# Patient Record
Sex: MALE | Race: BLACK OR AFRICAN AMERICAN | NOT HISPANIC OR LATINO | URBAN - METROPOLITAN AREA
[De-identification: names, ages, dates, MRNs, and addresses within clinical notes are randomized per-mention and may not be internally consistent; named-entity substitution may affect disease eponyms.]

---

## 2019-07-27 ENCOUNTER — HOSPITAL ENCOUNTER (OUTPATIENT)
Dept: LAB | Facility: HOSPITAL | Age: 34
Discharge: HOME | End: 2019-07-27
Attending: EMERGENCY MEDICINE

## 2019-10-30 ENCOUNTER — OFFICE VISIT CONVERTED (OUTPATIENT)
Dept: ORTHOPEDIC SURGERY | Facility: CLINIC | Age: 34
End: 2019-10-30
Attending: ORTHOPAEDIC SURGERY

## 2019-11-15 ENCOUNTER — OFFICE VISIT CONVERTED (OUTPATIENT)
Dept: ORTHOPEDIC SURGERY | Facility: CLINIC | Age: 34
End: 2019-11-15
Attending: PHYSICIAN ASSISTANT

## 2021-05-15 VITALS — WEIGHT: 211.37 LBS | BODY MASS INDEX: 28.01 KG/M2 | HEART RATE: 83 BPM | OXYGEN SATURATION: 98 % | HEIGHT: 73 IN

## 2021-05-15 VITALS — BODY MASS INDEX: 27.83 KG/M2 | OXYGEN SATURATION: 99 % | WEIGHT: 210 LBS | HEIGHT: 73 IN | HEART RATE: 102 BPM

## 2021-07-22 PROBLEM — F41.9 ANXIETY: Status: ACTIVE | Noted: 2021-07-22

## 2021-07-22 PROBLEM — G89.29 CHRONIC BACK PAIN: Status: ACTIVE | Noted: 2021-07-22

## 2021-07-22 PROBLEM — J30.9 ALLERGIC RHINITIS: Status: ACTIVE | Noted: 2021-07-22

## 2021-07-22 PROBLEM — M54.9 CHRONIC BACK PAIN: Status: ACTIVE | Noted: 2021-07-22

## 2021-07-22 PROBLEM — F32.A DEPRESSION: Status: ACTIVE | Noted: 2021-07-22

## 2021-07-22 PROBLEM — Z00.00 ANNUAL PHYSICAL EXAM: Status: ACTIVE | Noted: 2021-07-22

## 2021-07-22 NOTE — PROGRESS NOTES
"Chief Complaint  Pain (fell off of a step ladder last weekend and fractured rib on the Left side, still in severe pain, now that he is sleeping on his right side, his shoulder is bothering him)    Subjective          Jose Lyn presents to [facility]  History of Present Illness    35-year-old male with underlying hyperlipidemia, mild intermittent asthma, strong family history of colon cancer, who is being seen as a new patient in that his last physical was over 3 years ago.  We will review any labs that he perhaps happened to obtain before the appointment, order routine labs if none done, and address any new concerns.    Review of Systems   Constitutional: Negative for appetite change, fatigue and fever.   HENT: Negative for congestion and ear pain.    Eyes: Negative for blurred vision.   Respiratory: Negative for cough, chest tightness, shortness of breath and wheezing.         Patient has pleuritic chest pain to be expected.   Cardiovascular: Positive for chest pain. Negative for palpitations and leg swelling.        Patient with left chest wall pain secondary to the left 10th rib fracture.   Gastrointestinal: Negative for abdominal pain.   Genitourinary: Negative for difficulty urinating, dysuria and hematuria.   Musculoskeletal: Negative for arthralgias and gait problem.   Skin: Negative for skin lesions.   Neurological: Negative for syncope, memory problem and confusion.   Psychiatric/Behavioral: Negative for self-injury and depressed mood.       Objective   Vital Signs:   /92   Pulse 85   Temp 97.5 °F (36.4 °C)   Ht 185.4 cm (73\")   Wt 100 kg (221 lb 3.2 oz)   SpO2 97%   BMI 29.18 kg/m²           Physical Exam  Vitals and nursing note reviewed.   Constitutional:       General: He is not in acute distress.     Appearance: Normal appearance. He is not toxic-appearing.   HENT:      Head: Atraumatic.      Right Ear: External ear normal.      Left Ear: External " ear normal.      Nose: Nose normal.      Mouth/Throat:      Mouth: Mucous membranes are moist.   Eyes:      General:         Right eye: No discharge.         Left eye: No discharge.      Extraocular Movements: Extraocular movements intact.      Pupils: Pupils are equal, round, and reactive to light.   Neck:      Vascular: No carotid bruit.   Cardiovascular:      Rate and Rhythm: Normal rate and regular rhythm.      Pulses: Normal pulses.      Heart sounds: Normal heart sounds. No murmur heard.   No gallop.    Pulmonary:      Effort: Pulmonary effort is normal. No respiratory distress.      Breath sounds: No wheezing, rhonchi or rales.   Abdominal:      General: There is no distension.      Palpations: Abdomen is soft. There is no mass.      Tenderness: There is no abdominal tenderness. There is no guarding.   Musculoskeletal:         General: No swelling or tenderness.      Cervical back: No tenderness.      Right lower leg: No edema.      Left lower leg: No edema.   Skin:     General: Skin is warm and dry.      Findings: No rash.   Neurological:      General: No focal deficit present.      Mental Status: He is alert and oriented to person, place, and time. Mental status is at baseline.      Motor: No weakness.      Gait: Gait normal.   Psychiatric:         Mood and Affect: Mood normal.         Thought Content: Thought content normal.          Result Review :   The following data was reviewed by: Tyron Jason MD on 07/23/2021:                  Assessment and Plan    Diagnoses and all orders for this visit:    1. Annual physical exam (Primary)  Overview:  Patient with no ischemic symptoms with routine activities.  He is a .  Other than this recent issue with the fall, he has not had to be evaluated in the urgent cares or ERs.  It has been over 3 years since he has had any labs, his cholesterol was significantly elevated last time I saw him, so we should at least repeat that before too long.  His BP is up a  little bit today, this perhaps is to be expected given the pain.  Do not have easy access to what it was running in the past but has been 3 years.  We will follow up blood pressure on return to office.    (He does have family history of early heart disease, his mom had a stroke in the past just last year in 2020 at the age of only 63).    Orders:  -     CBC & Differential; Future  -     Comprehensive Metabolic Panel; Future  -     TSH+Free T4; Future    2. Closed fracture of one rib of left side with routine healing  Overview:  Patient was seen in urgent care just 4 days ago and diagnosed with a left 10th rib fracture:    Patient states that he fell off a stepstool and struck the side of a door frame.  Initially, he states he did not feel much pain.  However, today, he noticed pain in the left lower lateral rib area.  No cough or problems breathing.  Pain with movement and palpation.  No melena, hematochezia, or hematemesis.  No hematuria.  No saddle anesthesia.  No loss of bowel or bladder function.  No other injuries referred at this time.    Orders:  -     HYDROcodone-acetaminophen (Norco) 5-325 MG per tablet; Take 1 tablet by mouth Every 8 (Eight) Hours As Needed for Moderate Pain .  Dispense: 20 tablet; Refill: 0    3. Mixed hyperlipidemia  Overview:  LDL was 143 back in 2018.  He does have family history of heart disease and stroke.  Need to repeat this before too long, and I asked the patient to call for the results.    Orders:  -     Lipid Panel; Future      ANNUAL PHYSICAL 1/29/18:   = d/w all labs in detail; all good minus below; no CP/SOB at work; no new concerns;  --  LIPIDS witih , HDL 80's, but smoker with CAD F age 59, so continue to follow--->  and d/w diet please=get at least the 10 lbs off just put on and perhaps 20 total eventually; d/w diet drinks; f/u 6 mo now.  --  A.R. and no recent flares---> occ MDI use.  --  VIT D DEF per 7/15 labs and tx was started...rec lower to 2x/mo  now--->29 on q mo and rec 2x/mo at 1/18 OV.  --  MAJOR DEPRESSION '12 and again '14...no SI, has f/u with [facility]...resolved.  --  GERD w/o dysphagia=stable off med---> EGD '17 per Dr Mccall with ? PUD, tx'd with PPI, but stopped after f/u EGD.  HEMORRHOIDS and will julee with surgeon now=2/16--->no surgery rec.  --  FH= M/F/B/2 S...+ HTN/CVA/DM.  --  PSA  COLON julee 2/18...+polyp/FH+...per Dr Mccall <---FH+ COLON CA=40 yo Brother just dx'd ('14) and agree with screen ASAP  ( '12,  at [employer]; 3 yo son 5/17 and trying for another; 2 S, B above, parents alive 2/17)    Follow Up   Return in about 4 months (around 11/23/2021).  Patient was given instructions and counseling regarding his condition or for health maintenance advice. Please see specific information pulled into the AVS if appropriate.

## 2021-07-23 ENCOUNTER — OFFICE VISIT (OUTPATIENT)
Dept: INTERNAL MEDICINE | Facility: CLINIC | Age: 36
End: 2021-07-23

## 2021-07-23 VITALS
DIASTOLIC BLOOD PRESSURE: 92 MMHG | HEIGHT: 73 IN | TEMPERATURE: 97.5 F | SYSTOLIC BLOOD PRESSURE: 137 MMHG | BODY MASS INDEX: 29.31 KG/M2 | OXYGEN SATURATION: 97 % | WEIGHT: 221.2 LBS | HEART RATE: 85 BPM

## 2021-07-23 DIAGNOSIS — E78.2 MIXED HYPERLIPIDEMIA: ICD-10-CM

## 2021-07-23 DIAGNOSIS — S22.32XD CLOSED FRACTURE OF ONE RIB OF LEFT SIDE WITH ROUTINE HEALING: ICD-10-CM

## 2021-07-23 DIAGNOSIS — Z00.00 ANNUAL PHYSICAL EXAM: Primary | ICD-10-CM

## 2021-07-23 PROCEDURE — 99385 PREV VISIT NEW AGE 18-39: CPT | Performed by: INTERNAL MEDICINE

## 2021-07-23 RX ORDER — HYDROCODONE BITARTRATE AND ACETAMINOPHEN 5; 325 MG/1; MG/1
1 TABLET ORAL EVERY 8 HOURS PRN
Qty: 20 TABLET | Refills: 0 | Status: SHIPPED | OUTPATIENT
Start: 2021-07-23 | End: 2021-11-24

## 2021-11-20 ENCOUNTER — LAB (OUTPATIENT)
Dept: LAB | Facility: HOSPITAL | Age: 36
End: 2021-11-20

## 2021-11-20 DIAGNOSIS — E78.2 MIXED HYPERLIPIDEMIA: ICD-10-CM

## 2021-11-20 DIAGNOSIS — Z00.00 ANNUAL PHYSICAL EXAM: ICD-10-CM

## 2021-11-20 LAB
ALBUMIN SERPL-MCNC: 4.8 G/DL (ref 3.5–5.2)
ALBUMIN/GLOB SERPL: 1.5 G/DL
ALP SERPL-CCNC: 86 U/L (ref 39–117)
ALT SERPL W P-5'-P-CCNC: 59 U/L (ref 1–41)
ANION GAP SERPL CALCULATED.3IONS-SCNC: 10.6 MMOL/L (ref 5–15)
AST SERPL-CCNC: 36 U/L (ref 1–40)
BASOPHILS # BLD AUTO: 0.08 10*3/MM3 (ref 0–0.2)
BASOPHILS NFR BLD AUTO: 1.4 % (ref 0–1.5)
BILIRUB SERPL-MCNC: 0.2 MG/DL (ref 0–1.2)
BUN SERPL-MCNC: 8 MG/DL (ref 6–20)
BUN/CREAT SERPL: 8.5 (ref 7–25)
CALCIUM SPEC-SCNC: 9.5 MG/DL (ref 8.6–10.5)
CHLORIDE SERPL-SCNC: 105 MMOL/L (ref 98–107)
CHOLEST SERPL-MCNC: 245 MG/DL (ref 0–200)
CO2 SERPL-SCNC: 24.4 MMOL/L (ref 22–29)
CREAT SERPL-MCNC: 0.94 MG/DL (ref 0.76–1.27)
DEPRECATED RDW RBC AUTO: 41 FL (ref 37–54)
EOSINOPHIL # BLD AUTO: 0.17 10*3/MM3 (ref 0–0.4)
EOSINOPHIL NFR BLD AUTO: 3.1 % (ref 0.3–6.2)
ERYTHROCYTE [DISTWIDTH] IN BLOOD BY AUTOMATED COUNT: 13.4 % (ref 12.3–15.4)
GFR SERPL CREATININE-BSD FRML MDRD: 110 ML/MIN/1.73
GLOBULIN UR ELPH-MCNC: 3.1 GM/DL
GLUCOSE SERPL-MCNC: 103 MG/DL (ref 65–99)
HCT VFR BLD AUTO: 50 % (ref 37.5–51)
HDLC SERPL-MCNC: 48 MG/DL (ref 40–60)
HGB BLD-MCNC: 17.3 G/DL (ref 13–17.7)
IMM GRANULOCYTES # BLD AUTO: 0.03 10*3/MM3 (ref 0–0.05)
IMM GRANULOCYTES NFR BLD AUTO: 0.5 % (ref 0–0.5)
LDLC SERPL CALC-MCNC: 158 MG/DL (ref 0–100)
LDLC/HDLC SERPL: 3.23 {RATIO}
LYMPHOCYTES # BLD AUTO: 2 10*3/MM3 (ref 0.7–3.1)
LYMPHOCYTES NFR BLD AUTO: 36.2 % (ref 19.6–45.3)
MCH RBC QN AUTO: 29.3 PG (ref 26.6–33)
MCHC RBC AUTO-ENTMCNC: 34.6 G/DL (ref 31.5–35.7)
MCV RBC AUTO: 84.6 FL (ref 79–97)
MONOCYTES # BLD AUTO: 0.66 10*3/MM3 (ref 0.1–0.9)
MONOCYTES NFR BLD AUTO: 11.9 % (ref 5–12)
NEUTROPHILS NFR BLD AUTO: 2.59 10*3/MM3 (ref 1.7–7)
NEUTROPHILS NFR BLD AUTO: 46.9 % (ref 42.7–76)
NRBC BLD AUTO-RTO: 0.2 /100 WBC (ref 0–0.2)
PLATELET # BLD AUTO: 213 10*3/MM3 (ref 140–450)
PMV BLD AUTO: 11 FL (ref 6–12)
POTASSIUM SERPL-SCNC: 4.4 MMOL/L (ref 3.5–5.2)
PROT SERPL-MCNC: 7.9 G/DL (ref 6–8.5)
RBC # BLD AUTO: 5.91 10*6/MM3 (ref 4.14–5.8)
SODIUM SERPL-SCNC: 140 MMOL/L (ref 136–145)
T4 FREE SERPL-MCNC: 0.9 NG/DL (ref 0.93–1.7)
TRIGL SERPL-MCNC: 211 MG/DL (ref 0–150)
TSH SERPL DL<=0.05 MIU/L-ACNC: 1.3 UIU/ML (ref 0.27–4.2)
VLDLC SERPL-MCNC: 39 MG/DL (ref 5–40)
WBC NRBC COR # BLD: 5.53 10*3/MM3 (ref 3.4–10.8)

## 2021-11-20 PROCEDURE — 84443 ASSAY THYROID STIM HORMONE: CPT

## 2021-11-20 PROCEDURE — 80053 COMPREHEN METABOLIC PANEL: CPT

## 2021-11-20 PROCEDURE — 36415 COLL VENOUS BLD VENIPUNCTURE: CPT

## 2021-11-20 PROCEDURE — 80061 LIPID PANEL: CPT

## 2021-11-20 PROCEDURE — 84439 ASSAY OF FREE THYROXINE: CPT

## 2021-11-20 PROCEDURE — 85025 COMPLETE CBC W/AUTO DIFF WBC: CPT

## 2021-11-23 NOTE — PROGRESS NOTES
"Chief Complaint:  Follow-up (4 month follow up)    Subjective          Jose Lyn presents to [facility]    History of present illness:    35-year-old male with underlying hyperlipidemia, mild intermittent asthma, strong family history of colon cancer, who is being seen 7/21 as a new patient in that his last physical was over 3 years ago.  We will review any labs that he perhaps happened to obtain before the appointment, order routine labs if none done, and address any new concerns.---> Patient coming back 11/21 for routine 4-month follow-up of the above.    Review of Systems   Constitutional: Negative for appetite change, fatigue and fever.   HENT: Negative for congestion and ear pain.    Eyes: Negative for blurred vision.   Respiratory: Negative for cough, chest tightness, shortness of breath and wheezing.         Patient has pleuritic chest pain to be expected.   Cardiovascular: Positive for chest pain. Negative for palpitations and leg swelling.        Patient with left chest wall pain secondary to the left 10th rib fracture.   Gastrointestinal: Negative for abdominal pain.   Genitourinary: Negative for difficulty urinating, dysuria and hematuria.   Musculoskeletal: Negative for arthralgias and gait problem.   Skin: Negative for skin lesions.   Neurological: Negative for syncope, memory problem and confusion.   Psychiatric/Behavioral: Negative for self-injury and depressed mood.       Objective   Vital Signs:   /88   Pulse 70   Temp 97.1 °F (36.2 °C) (Temporal)   Ht 185.4 cm (72.99\")   Wt 105 kg (230 lb 6.4 oz)   SpO2 97%   BMI 30.40 kg/m²           Physical Exam  Vitals and nursing note reviewed.   Constitutional:       General: He is not in acute distress.     Appearance: Normal appearance. He is not toxic-appearing.   HENT:      Head: Atraumatic.      Right Ear: External ear normal.      Left Ear: External ear normal.      Nose: Nose normal.      " Mouth/Throat:      Mouth: Mucous membranes are moist.   Eyes:      General:         Right eye: No discharge.         Left eye: No discharge.      Extraocular Movements: Extraocular movements intact.      Pupils: Pupils are equal, round, and reactive to light.   Neck:      Vascular: No carotid bruit.   Cardiovascular:      Rate and Rhythm: Normal rate and regular rhythm.      Pulses: Normal pulses.      Heart sounds: Normal heart sounds. No murmur heard.  No gallop.    Pulmonary:      Effort: Pulmonary effort is normal. No respiratory distress.      Breath sounds: No wheezing, rhonchi or rales.   Abdominal:      General: There is no distension.      Palpations: Abdomen is soft. There is no mass.      Tenderness: There is no abdominal tenderness. There is no guarding.   Musculoskeletal:         General: No swelling or tenderness.      Cervical back: No tenderness.      Right lower leg: No edema.      Left lower leg: No edema.   Skin:     General: Skin is warm and dry.      Findings: No rash.   Neurological:      General: No focal deficit present.      Mental Status: He is alert and oriented to person, place, and time. Mental status is at baseline.      Motor: No weakness.      Gait: Gait normal.   Psychiatric:         Mood and Affect: Mood normal.         Thought Content: Thought content normal.          Result Review :   The following data was reviewed by: Tyron Jason MD on 07/23/2021:                  Assessment and Plan    Diagnoses and all orders for this visit:    1. Adenomatous polyp of colon, unspecified part of colon (Primary)  Overview:  Patient with history of prior polyps, also with family history significant for early onset colon cancer.  Patient's last scope was in 2/18, and he is to follow-up with Dr. Mcclelland in 5 to 7 years.  No symptoms as of 11/21 OV.      2. Mixed hyperlipidemia  Overview:  LDL was 143 back in 2018.  He does have family history of heart disease and stroke.---> LDL is up to 158 as of  11/21.  Previously his LDL was as low as 110 actually.  Patient is aware he needs to make some significant changes to his diet, will defer treatment at this time, but repeat labs in 6 months is recommended.    Orders:  -     Comprehensive Metabolic Panel; Future  -     Lipid Panel; Future  -     High Sensitivity CRP; Future  -     Homocysteine; Future    3. Elevated blood pressure reading in office without diagnosis of hypertension  Overview:  Blood pressure is upper limits normal as of 11/21 OV.  It was some better on my repeat.  His elevated level previously was related to a rib fracture.  He will monitor at home periodically and let us know if it is staying in this ballpark or not.      4. Other fatigue  Overview:  Need to keep an eye on his CBC in particular given smoking history.  Additionally, patient does have sickle cell trait.    Orders:  -     CBC & Differential; Future  -     Ferritin; Future  -     Iron Profile; Future      ANNUAL PHYSICAL 1/29/18:   = d/w all labs in detail; all good minus below; no CP/SOB at work; no new concerns;  --  LIPIDS witih , HDL 80's, but smoker with CAD F age 59, so continue to follow--->  and d/w diet please=get at least the 10 lbs off just put on and perhaps 20 total eventually; d/w diet drinks; f/u 6 mo now.  --  A.R. and no recent flares---> occ MDI use.  --  VIT D DEF per 7/15 labs and tx was started...rec lower to 2x/mo now--->29 on q mo and rec 2x/mo at 1/18 OV.  --  MAJOR DEPRESSION '12 and again '14...no SI, has f/u with [facility]...resolved.  --  GERD w/o dysphagia=stable off med---> EGD '17 per Dr Mccall with ? PUD, tx'd with PPI, but stopped after f/u EGD.  HEMORRHOIDS and will julee with surgeon now=2/16--->no surgery rec.  --  FH= M/F/B/2 S...+ HTN/CVA/DM.  --  PSA  COLON julee 2/18...+polyp/FH+...per Dr Mccall <---FH+ COLON CA=38 yo Brother just dx'd ('14) and agree with screen ASAP  (; son born 8/21, 7 yo son 8/21; 2 S, B above, mom passed  in 2020 with CHF/obesity.  Father living as of 11/21, has sickle cell trait, has had TIAs as well as history of CHF).    Follow Up   Return in about 6 months (around 5/24/2022).  Patient was given instructions and counseling regarding his condition or for health maintenance advice. Please see specific information pulled into the AVS if appropriate.

## 2021-11-24 ENCOUNTER — OFFICE VISIT (OUTPATIENT)
Dept: INTERNAL MEDICINE | Facility: CLINIC | Age: 36
End: 2021-11-24

## 2021-11-24 VITALS
HEART RATE: 70 BPM | DIASTOLIC BLOOD PRESSURE: 88 MMHG | WEIGHT: 230.4 LBS | BODY MASS INDEX: 30.54 KG/M2 | HEIGHT: 73 IN | SYSTOLIC BLOOD PRESSURE: 126 MMHG | TEMPERATURE: 97.1 F | OXYGEN SATURATION: 97 %

## 2021-11-24 DIAGNOSIS — R03.0 ELEVATED BLOOD PRESSURE READING IN OFFICE WITHOUT DIAGNOSIS OF HYPERTENSION: ICD-10-CM

## 2021-11-24 DIAGNOSIS — D12.6 ADENOMATOUS POLYP OF COLON, UNSPECIFIED PART OF COLON: Primary | ICD-10-CM

## 2021-11-24 DIAGNOSIS — E78.2 MIXED HYPERLIPIDEMIA: ICD-10-CM

## 2021-11-24 DIAGNOSIS — R53.83 OTHER FATIGUE: ICD-10-CM

## 2021-11-24 PROCEDURE — 99213 OFFICE O/P EST LOW 20 MIN: CPT | Performed by: INTERNAL MEDICINE

## 2021-11-24 RX ORDER — ACETAMINOPHEN 325 MG/1
650 TABLET ORAL EVERY 6 HOURS PRN
COMMUNITY
End: 2022-11-01

## 2022-10-14 ENCOUNTER — HOSPITAL ENCOUNTER (EMERGENCY)
Facility: HOSPITAL | Age: 37
Discharge: LEFT BEFORE DISCHARGE | End: 2022-10-14

## 2022-10-14 VITALS
RESPIRATION RATE: 18 BRPM | WEIGHT: 223.99 LBS | TEMPERATURE: 98.3 F | BODY MASS INDEX: 29.69 KG/M2 | OXYGEN SATURATION: 98 % | SYSTOLIC BLOOD PRESSURE: 145 MMHG | DIASTOLIC BLOOD PRESSURE: 83 MMHG | HEIGHT: 73 IN | HEART RATE: 77 BPM

## 2022-10-14 LAB
ABO GROUP BLD: NORMAL
ALBUMIN SERPL-MCNC: 5 G/DL (ref 3.5–5.2)
ALBUMIN/GLOB SERPL: 1.5 G/DL
ALP SERPL-CCNC: 80 U/L (ref 39–117)
ALT SERPL W P-5'-P-CCNC: 74 U/L (ref 1–41)
ANION GAP SERPL CALCULATED.3IONS-SCNC: 10.8 MMOL/L (ref 5–15)
AST SERPL-CCNC: 45 U/L (ref 1–40)
BASOPHILS # BLD AUTO: 0.08 10*3/MM3 (ref 0–0.2)
BASOPHILS NFR BLD AUTO: 1.2 % (ref 0–1.5)
BILIRUB SERPL-MCNC: 0.4 MG/DL (ref 0–1.2)
BLD GP AB SCN SERPL QL: NEGATIVE
BUN SERPL-MCNC: 10 MG/DL (ref 6–20)
BUN/CREAT SERPL: 9.8 (ref 7–25)
CALCIUM SPEC-SCNC: 10.1 MG/DL (ref 8.6–10.5)
CHLORIDE SERPL-SCNC: 101 MMOL/L (ref 98–107)
CO2 SERPL-SCNC: 27.2 MMOL/L (ref 22–29)
CREAT SERPL-MCNC: 1.02 MG/DL (ref 0.76–1.27)
DEPRECATED RDW RBC AUTO: 36.6 FL (ref 37–54)
EGFRCR SERPLBLD CKD-EPI 2021: 97.1 ML/MIN/1.73
EOSINOPHIL # BLD AUTO: 0.12 10*3/MM3 (ref 0–0.4)
EOSINOPHIL NFR BLD AUTO: 1.8 % (ref 0.3–6.2)
ERYTHROCYTE [DISTWIDTH] IN BLOOD BY AUTOMATED COUNT: 12.8 % (ref 12.3–15.4)
GLOBULIN UR ELPH-MCNC: 3.3 GM/DL
GLUCOSE SERPL-MCNC: 93 MG/DL (ref 65–99)
HCT VFR BLD AUTO: 44.2 % (ref 37.5–51)
HGB BLD-MCNC: 16.4 G/DL (ref 13–17.7)
HOLD SPECIMEN: NORMAL
HOLD SPECIMEN: NORMAL
IMM GRANULOCYTES # BLD AUTO: 0.02 10*3/MM3 (ref 0–0.05)
IMM GRANULOCYTES NFR BLD AUTO: 0.3 % (ref 0–0.5)
LYMPHOCYTES # BLD AUTO: 2.27 10*3/MM3 (ref 0.7–3.1)
LYMPHOCYTES NFR BLD AUTO: 33.6 % (ref 19.6–45.3)
MCH RBC QN AUTO: 29.7 PG (ref 26.6–33)
MCHC RBC AUTO-ENTMCNC: 37.1 G/DL (ref 31.5–35.7)
MCV RBC AUTO: 79.9 FL (ref 79–97)
MONOCYTES # BLD AUTO: 0.93 10*3/MM3 (ref 0.1–0.9)
MONOCYTES NFR BLD AUTO: 13.8 % (ref 5–12)
NEUTROPHILS NFR BLD AUTO: 3.33 10*3/MM3 (ref 1.7–7)
NEUTROPHILS NFR BLD AUTO: 49.3 % (ref 42.7–76)
NRBC BLD AUTO-RTO: 0 /100 WBC (ref 0–0.2)
PLATELET # BLD AUTO: 210 10*3/MM3 (ref 140–450)
PMV BLD AUTO: 9.9 FL (ref 6–12)
POTASSIUM SERPL-SCNC: 3.9 MMOL/L (ref 3.5–5.2)
PROT SERPL-MCNC: 8.3 G/DL (ref 6–8.5)
RBC # BLD AUTO: 5.53 10*6/MM3 (ref 4.14–5.8)
RH BLD: POSITIVE
SMALL PLATELETS BLD QL SMEAR: ADEQUATE
SODIUM SERPL-SCNC: 139 MMOL/L (ref 136–145)
T&S EXPIRATION DATE: NORMAL
TARGETS BLD QL SMEAR: NORMAL
WBC MORPH BLD: NORMAL
WBC NRBC COR # BLD: 6.75 10*3/MM3 (ref 3.4–10.8)
WHOLE BLOOD HOLD COAG: NORMAL
WHOLE BLOOD HOLD SPECIMEN: NORMAL

## 2022-10-14 PROCEDURE — 86850 RBC ANTIBODY SCREEN: CPT

## 2022-10-14 PROCEDURE — 85025 COMPLETE CBC W/AUTO DIFF WBC: CPT

## 2022-10-14 PROCEDURE — 85007 BL SMEAR W/DIFF WBC COUNT: CPT

## 2022-10-14 PROCEDURE — 99211 OFF/OP EST MAY X REQ PHY/QHP: CPT

## 2022-10-14 PROCEDURE — 36415 COLL VENOUS BLD VENIPUNCTURE: CPT

## 2022-10-14 PROCEDURE — 86901 BLOOD TYPING SEROLOGIC RH(D): CPT

## 2022-10-14 PROCEDURE — 86900 BLOOD TYPING SEROLOGIC ABO: CPT

## 2022-10-14 PROCEDURE — 80053 COMPREHEN METABOLIC PANEL: CPT

## 2022-10-14 RX ORDER — SODIUM CHLORIDE 0.9 % (FLUSH) 0.9 %
10 SYRINGE (ML) INJECTION AS NEEDED
Status: DISCONTINUED | OUTPATIENT
Start: 2022-10-14 | End: 2022-10-14 | Stop reason: HOSPADM

## 2022-10-21 ENCOUNTER — TELEPHONE (OUTPATIENT)
Dept: INTERNAL MEDICINE | Facility: CLINIC | Age: 37
End: 2022-10-21

## 2022-10-21 NOTE — TELEPHONE ENCOUNTER
Caller: Jose Lyn    Relationship: Self    Best call back number: [phone]    What is the medical concern/diagnosis:     What specialty or service is being requested: GASTRO    What is the provider, practice or medical service name: DR ALCANTAR     What is the office location: [address] [place] [address]    What is the office phone number: [phone]    Any additional details:

## 2022-10-25 ENCOUNTER — TELEPHONE (OUTPATIENT)
Dept: INTERNAL MEDICINE | Facility: CLINIC | Age: 37
End: 2022-10-25

## 2022-10-25 DIAGNOSIS — D12.6 ADENOMATOUS POLYP OF COLON, UNSPECIFIED PART OF COLON: Primary | ICD-10-CM

## 2022-10-25 NOTE — TELEPHONE ENCOUNTER
Spoke to Dr Mccall's office and they said they would be calling the pt to schedule. I informed the pt and he understood

## 2022-10-25 NOTE — TELEPHONE ENCOUNTER
Caller: Jose Lyn    Relationship: Self    Best call back number: [phone]    Who are you requesting to speak with (clinical staff, provider,  specific staff member): CLINICAL    What was the call regarding: PATIENT STATES HE WOULD LIKE TO KNOW IF THE REFERRAL WAS SENT TO DR ALCANTAR.    Do you require a callback: YES

## 2022-10-26 NOTE — TELEPHONE ENCOUNTER
This has been taken care of in another message and referral was put in to Dr. Mccall and pt has made an appt with Dr. Jason.

## 2022-11-01 ENCOUNTER — OFFICE VISIT (OUTPATIENT)
Dept: INTERNAL MEDICINE | Facility: CLINIC | Age: 37
End: 2022-11-01

## 2022-11-01 VITALS
TEMPERATURE: 98 F | WEIGHT: 226.8 LBS | OXYGEN SATURATION: 99 % | SYSTOLIC BLOOD PRESSURE: 152 MMHG | DIASTOLIC BLOOD PRESSURE: 100 MMHG | BODY MASS INDEX: 30.06 KG/M2 | HEIGHT: 73 IN | HEART RATE: 92 BPM

## 2022-11-01 DIAGNOSIS — Z00.00 ANNUAL PHYSICAL EXAM: ICD-10-CM

## 2022-11-01 DIAGNOSIS — R79.89 ELEVATED LFTS: ICD-10-CM

## 2022-11-01 DIAGNOSIS — E78.2 MIXED HYPERLIPIDEMIA: Primary | ICD-10-CM

## 2022-11-01 DIAGNOSIS — R53.83 OTHER FATIGUE: ICD-10-CM

## 2022-11-01 DIAGNOSIS — D12.6 ADENOMATOUS POLYP OF COLON, UNSPECIFIED PART OF COLON: ICD-10-CM

## 2022-11-01 DIAGNOSIS — R03.0 ELEVATED BLOOD PRESSURE READING IN OFFICE WITHOUT DIAGNOSIS OF HYPERTENSION: ICD-10-CM

## 2022-11-01 DIAGNOSIS — K92.1 HEMATOCHEZIA: ICD-10-CM

## 2022-11-01 LAB
BASOPHILS # BLD AUTO: 0.08 10*3/MM3 (ref 0–0.2)
BASOPHILS NFR BLD AUTO: 1.4 % (ref 0–1.5)
CHOLEST SERPL-MCNC: 274 MG/DL (ref 0–200)
DEPRECATED RDW RBC AUTO: 40 FL (ref 37–54)
EOSINOPHIL # BLD AUTO: 0.13 10*3/MM3 (ref 0–0.4)
EOSINOPHIL NFR BLD AUTO: 2.2 % (ref 0.3–6.2)
ERYTHROCYTE [DISTWIDTH] IN BLOOD BY AUTOMATED COUNT: 13.7 % (ref 12.3–15.4)
FERRITIN SERPL-MCNC: 713 NG/ML (ref 30–400)
FOLATE SERPL-MCNC: 8.02 NG/ML (ref 4.78–24.2)
HAV IGM SERPL QL IA: NORMAL
HBV CORE IGM SERPL QL IA: NORMAL
HBV SURFACE AG SERPL QL IA: NORMAL
HCT VFR BLD AUTO: 49.4 % (ref 37.5–51)
HCV AB SER DONR QL: NORMAL
HDLC SERPL-MCNC: 71 MG/DL (ref 40–60)
HGB BLD-MCNC: 17.4 G/DL (ref 13–17.7)
IMM GRANULOCYTES # BLD AUTO: 0.03 10*3/MM3 (ref 0–0.05)
IMM GRANULOCYTES NFR BLD AUTO: 0.5 % (ref 0–0.5)
IRON 24H UR-MRATE: 110 MCG/DL (ref 59–158)
IRON SATN MFR SERPL: 29 % (ref 20–50)
LDLC SERPL CALC-MCNC: 167 MG/DL (ref 0–100)
LDLC/HDLC SERPL: 2.3 {RATIO}
LYMPHOCYTES # BLD AUTO: 1.63 10*3/MM3 (ref 0.7–3.1)
LYMPHOCYTES NFR BLD AUTO: 28.1 % (ref 19.6–45.3)
MCH RBC QN AUTO: 29 PG (ref 26.6–33)
MCHC RBC AUTO-ENTMCNC: 35.2 G/DL (ref 31.5–35.7)
MCV RBC AUTO: 82.2 FL (ref 79–97)
MONOCYTES # BLD AUTO: 0.57 10*3/MM3 (ref 0.1–0.9)
MONOCYTES NFR BLD AUTO: 9.8 % (ref 5–12)
NEUTROPHILS NFR BLD AUTO: 3.36 10*3/MM3 (ref 1.7–7)
NEUTROPHILS NFR BLD AUTO: 58 % (ref 42.7–76)
NRBC BLD AUTO-RTO: 0 /100 WBC (ref 0–0.2)
PLATELET # BLD AUTO: 202 10*3/MM3 (ref 140–450)
PMV BLD AUTO: 10.5 FL (ref 6–12)
RBC # BLD AUTO: 6.01 10*6/MM3 (ref 4.14–5.8)
T4 FREE SERPL-MCNC: 0.97 NG/DL (ref 0.93–1.7)
TESTOST SERPL-MCNC: 549 NG/DL (ref 249–836)
TIBC SERPL-MCNC: 386 MCG/DL (ref 298–536)
TRANSFERRIN SERPL-MCNC: 259 MG/DL (ref 200–360)
TRIGL SERPL-MCNC: 200 MG/DL (ref 0–150)
TSH SERPL DL<=0.05 MIU/L-ACNC: 1.77 UIU/ML (ref 0.27–4.2)
VIT B12 BLD-MCNC: 668 PG/ML (ref 211–946)
VLDLC SERPL-MCNC: 36 MG/DL (ref 5–40)
WBC NRBC COR # BLD: 5.8 10*3/MM3 (ref 3.4–10.8)

## 2022-11-01 PROCEDURE — 82746 ASSAY OF FOLIC ACID SERUM: CPT | Performed by: INTERNAL MEDICINE

## 2022-11-01 PROCEDURE — 99395 PREV VISIT EST AGE 18-39: CPT | Performed by: INTERNAL MEDICINE

## 2022-11-01 PROCEDURE — 84466 ASSAY OF TRANSFERRIN: CPT | Performed by: INTERNAL MEDICINE

## 2022-11-01 PROCEDURE — 82728 ASSAY OF FERRITIN: CPT | Performed by: INTERNAL MEDICINE

## 2022-11-01 PROCEDURE — 80061 LIPID PANEL: CPT | Performed by: INTERNAL MEDICINE

## 2022-11-01 PROCEDURE — 82607 VITAMIN B-12: CPT | Performed by: INTERNAL MEDICINE

## 2022-11-01 PROCEDURE — 85025 COMPLETE CBC W/AUTO DIFF WBC: CPT | Performed by: INTERNAL MEDICINE

## 2022-11-01 PROCEDURE — 84443 ASSAY THYROID STIM HORMONE: CPT | Performed by: INTERNAL MEDICINE

## 2022-11-01 PROCEDURE — 84439 ASSAY OF FREE THYROXINE: CPT | Performed by: INTERNAL MEDICINE

## 2022-11-01 PROCEDURE — 36415 COLL VENOUS BLD VENIPUNCTURE: CPT | Performed by: INTERNAL MEDICINE

## 2022-11-01 PROCEDURE — 83540 ASSAY OF IRON: CPT | Performed by: INTERNAL MEDICINE

## 2022-11-01 PROCEDURE — 84403 ASSAY OF TOTAL TESTOSTERONE: CPT | Performed by: INTERNAL MEDICINE

## 2022-11-01 PROCEDURE — 80074 ACUTE HEPATITIS PANEL: CPT | Performed by: INTERNAL MEDICINE

## 2022-11-01 RX ORDER — ATORVASTATIN CALCIUM 10 MG/1
10 TABLET, FILM COATED ORAL NIGHTLY
Qty: 90 TABLET | Refills: 1 | Status: SHIPPED | OUTPATIENT
Start: 2022-11-01 | End: 2023-01-30

## 2022-11-01 RX ORDER — ATORVASTATIN CALCIUM 20 MG/1
10 TABLET, FILM COATED ORAL NIGHTLY
Qty: 90 TABLET | Refills: 1 | Status: SHIPPED | OUTPATIENT
Start: 2022-11-01 | End: 2022-11-01 | Stop reason: SDUPTHER

## 2022-11-01 RX ORDER — PANTOPRAZOLE SODIUM 40 MG/1
40 TABLET, DELAYED RELEASE ORAL DAILY
Qty: 30 TABLET | Refills: 2 | Status: SHIPPED | OUTPATIENT
Start: 2022-11-01 | End: 2023-02-01

## 2022-11-01 RX ORDER — HYDROCORTISONE ACETATE 25 MG/1
25 SUPPOSITORY RECTAL 2 TIMES DAILY
Qty: 20 SUPPOSITORY | Refills: 1 | Status: SHIPPED | OUTPATIENT
Start: 2022-11-01 | End: 2023-02-01

## 2022-11-01 RX ORDER — BROMPHENIRAMINE MALEATE, PSEUDOEPHEDRINE HYDROCHLORIDE, AND DEXTROMETHORPHAN HYDROBROMIDE 2; 30; 10 MG/5ML; MG/5ML; MG/5ML
SYRUP ORAL
COMMUNITY
Start: 2022-10-25 | End: 2022-11-01

## 2022-11-01 NOTE — ASSESSMENT & PLAN NOTE
Blood pressure is upper limits normal as of 11/21 OV.  It was some better on my repeat.  His elevated level previously was related to a rib fracture.  He will monitor at home periodically and let us know if it is staying in this ballpark or not.---> Blood pressure elevated as of his 9/22 office visit.  It was borderline when he was seen in the ER couple of weeks ago.  Asked patient to check his blood pressure at home and/or at the stores, and write a few numbers down here over the next couple of months.

## 2022-11-01 NOTE — ASSESSMENT & PLAN NOTE
As noted, patient with drops of blood in the toilet for past couple of months, seen in ER.  He was not anemic at that time, organ to repeat his studies and get iron studies here shortly as of his 11/22 appointment.  Additionally, we will get him back on a PPI for his indigestion in the morning, and use a Anusol suppository for his known history of internal hemorrhoids as possible etiology of the bleeding.  He has appointment with GI next month, we will see about getting this moved up if he is getting anemic.

## 2022-11-01 NOTE — ASSESSMENT & PLAN NOTE
Patient is having some blood in his stool, drips of blood, but no pain with bowel movements.  It is several times a day, perhaps not with every bowel movement though.  For the past 2-1/2 months.

## 2022-11-01 NOTE — PATIENT INSTRUCTIONS
The the new COVID-vaccine you should consider taking is referred to as the bivalent vaccine.  There are two versions out, one by Moderna and one by Pfizer, they are interchangeable.  Current recommendation is just for one dose of this.    2.  There are orders pending for fasting blood tests in regards to cholesterol, but also labs to follow-up the bleeding and the elevated liver enzymes.  Please get these done in the next week or so and call for the results.    3.  Check your blood pressure once a week or so when got some numbers down in your phone, let me know if they are elevated more often than not.

## 2022-11-04 ENCOUNTER — TELEPHONE (OUTPATIENT)
Dept: INTERNAL MEDICINE | Facility: CLINIC | Age: 37
End: 2022-11-04

## 2022-11-04 NOTE — TELEPHONE ENCOUNTER
HUB STAFF ATTEMPTED TO FOLLOW WARM TRANSFER PROCESS AND WAS UNSUCCESSFUL.  Caller: Jose Lyn    Relationship: Self    Best call back number:[phone]    Caller requesting test results:YES    What test was performed: BLOOD WORK     When was the test performed: 11/1/22     Where was the test performed: IN OFFICE     Additional notes: PATIENT STATED: RETURNING CALL AND REQUESTING A CALL BACK

## 2022-12-28 ENCOUNTER — PREP FOR SURGERY (OUTPATIENT)
Dept: OTHER | Facility: HOSPITAL | Age: 37
End: 2022-12-28

## 2022-12-28 ENCOUNTER — OFFICE VISIT (OUTPATIENT)
Dept: GASTROENTEROLOGY | Facility: CLINIC | Age: 37
End: 2022-12-28

## 2022-12-28 VITALS
BODY MASS INDEX: 30.62 KG/M2 | HEART RATE: 83 BPM | SYSTOLIC BLOOD PRESSURE: 139 MMHG | DIASTOLIC BLOOD PRESSURE: 104 MMHG | WEIGHT: 231 LBS | HEIGHT: 73 IN

## 2022-12-28 DIAGNOSIS — Z80.0 FAMILY HISTORY OF COLON CANCER: ICD-10-CM

## 2022-12-28 DIAGNOSIS — K62.5 RECTAL BLEEDING: ICD-10-CM

## 2022-12-28 DIAGNOSIS — R79.89 ELEVATED LFTS: Primary | ICD-10-CM

## 2022-12-28 DIAGNOSIS — Z80.0 FAMILY HISTORY OF COLON CANCER: Primary | ICD-10-CM

## 2022-12-28 DIAGNOSIS — R12 HEARTBURN: ICD-10-CM

## 2022-12-28 PROCEDURE — 99204 OFFICE O/P NEW MOD 45 MIN: CPT | Performed by: NURSE PRACTITIONER

## 2022-12-28 RX ORDER — POLYETHYLENE GLYCOL-3350 AND ELECTROLYTES WITH FLAVOR PACK 240; 5.84; 2.98; 6.72; 22.72 G/278.26G; G/278.26G; G/278.26G; G/278.26G; G/278.26G
4000 POWDER, FOR SOLUTION ORAL ONCE
Qty: 4000 ML | Refills: 0 | Status: SHIPPED | OUTPATIENT
Start: 2022-12-28 | End: 2022-12-28

## 2022-12-28 NOTE — PATIENT INSTRUCTIONS
Stop alcohol  Follow-up with primary care regarding high blood pressure, recommended getting a cuff to monitor at home as well
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
## 2023-01-16 ENCOUNTER — HOSPITAL ENCOUNTER (OUTPATIENT)
Dept: ULTRASOUND IMAGING | Facility: HOSPITAL | Age: 38
Discharge: HOME | End: 2023-01-16
Admitting: NURSE PRACTITIONER
Payer: COMMERCIAL

## 2023-01-16 DIAGNOSIS — E78.2 MIXED HYPERLIPIDEMIA: ICD-10-CM

## 2023-01-16 DIAGNOSIS — R79.89 ELEVATED LFTS: ICD-10-CM

## 2023-01-16 LAB
ALBUMIN SERPL-MCNC: 4.9 G/DL (ref 3.5–5.2)
ALBUMIN/GLOB SERPL: 1.5 G/DL
ALP SERPL-CCNC: 77 U/L (ref 39–117)
ALT SERPL W P-5'-P-CCNC: 84 U/L (ref 1–41)
ANION GAP SERPL CALCULATED.3IONS-SCNC: 15.6 MMOL/L (ref 5–15)
AST SERPL-CCNC: 54 U/L (ref 1–40)
BILIRUB SERPL-MCNC: 0.3 MG/DL (ref 0–1.2)
BUN SERPL-MCNC: 10 MG/DL (ref 6–20)
BUN/CREAT SERPL: 11.1 (ref 7–25)
CALCIUM SPEC-SCNC: 9.5 MG/DL (ref 8.6–10.5)
CHLORIDE SERPL-SCNC: 101 MMOL/L (ref 98–107)
CHOLEST SERPL-MCNC: 271 MG/DL (ref 0–200)
CK SERPL-CCNC: 755 U/L (ref 20–200)
CO2 SERPL-SCNC: 25.4 MMOL/L (ref 22–29)
CREAT SERPL-MCNC: 0.9 MG/DL (ref 0.76–1.27)
EGFRCR SERPLBLD CKD-EPI 2021: 112.1 ML/MIN/1.73
GGT SERPL-CCNC: 152 U/L (ref 8–61)
GLOBULIN UR ELPH-MCNC: 3.3 GM/DL
GLUCOSE SERPL-MCNC: 81 MG/DL (ref 65–99)
HDLC SERPL-MCNC: 67 MG/DL (ref 40–60)
LDLC SERPL CALC-MCNC: 171 MG/DL (ref 0–100)
LDLC/HDLC SERPL: 2.5 {RATIO}
POTASSIUM SERPL-SCNC: 4 MMOL/L (ref 3.5–5.2)
PROT SERPL-MCNC: 8.2 G/DL (ref 6–8.5)
SODIUM SERPL-SCNC: 142 MMOL/L (ref 136–145)
TRIGL SERPL-MCNC: 181 MG/DL (ref 0–150)
VLDLC SERPL-MCNC: 33 MG/DL (ref 5–40)

## 2023-01-16 PROCEDURE — 76705 ECHO EXAM OF ABDOMEN: CPT

## 2023-01-16 PROCEDURE — 80053 COMPREHEN METABOLIC PANEL: CPT | Performed by: INTERNAL MEDICINE

## 2023-01-16 PROCEDURE — 80061 LIPID PANEL: CPT | Performed by: INTERNAL MEDICINE

## 2023-01-16 PROCEDURE — 82550 ASSAY OF CK (CPK): CPT | Performed by: INTERNAL MEDICINE

## 2023-01-16 PROCEDURE — 82977 ASSAY OF GGT: CPT | Performed by: INTERNAL MEDICINE

## 2023-01-17 ENCOUNTER — TELEPHONE (OUTPATIENT)
Dept: GASTROENTEROLOGY | Facility: CLINIC | Age: 38
End: 2023-01-17
Payer: COMMERCIAL

## 2023-01-17 NOTE — TELEPHONE ENCOUNTER
----- Message from VIANEY Toribio sent at 1/16/2023  5:41 PM EST -----  Repeat ultrasound in 6 months due to possibility of small gallbladder polyp.  There is no gallbladder inflammation, liver appears fatty and with slight enlargement.  Recommend weight loss, low-carb diet, Stop alcohol, 2 to 4 cups of coffee per day recommended

## 2023-02-01 ENCOUNTER — OFFICE VISIT (OUTPATIENT)
Dept: INTERNAL MEDICINE | Facility: CLINIC | Age: 38
End: 2023-02-01
Payer: COMMERCIAL

## 2023-02-01 VITALS
TEMPERATURE: 98.7 F | BODY MASS INDEX: 30.51 KG/M2 | HEART RATE: 63 BPM | DIASTOLIC BLOOD PRESSURE: 82 MMHG | OXYGEN SATURATION: 92 % | WEIGHT: 230.2 LBS | HEIGHT: 73 IN | SYSTOLIC BLOOD PRESSURE: 135 MMHG

## 2023-02-01 DIAGNOSIS — E78.2 MIXED HYPERLIPIDEMIA: Primary | ICD-10-CM

## 2023-02-01 DIAGNOSIS — R79.89 ELEVATED LFTS: ICD-10-CM

## 2023-02-01 DIAGNOSIS — E83.19 IRON OVERLOAD: ICD-10-CM

## 2023-02-01 DIAGNOSIS — K92.1 HEMATOCHEZIA: ICD-10-CM

## 2023-02-01 DIAGNOSIS — D12.6 ADENOMATOUS POLYP OF COLON, UNSPECIFIED PART OF COLON: ICD-10-CM

## 2023-02-01 PROBLEM — G89.29 CHRONIC BACK PAIN: Status: RESOLVED | Noted: 2021-07-22 | Resolved: 2023-02-01

## 2023-02-01 PROBLEM — F32.A DEPRESSION: Status: RESOLVED | Noted: 2021-07-22 | Resolved: 2023-02-01

## 2023-02-01 PROBLEM — J30.9 ALLERGIC RHINITIS: Status: RESOLVED | Noted: 2021-07-22 | Resolved: 2023-02-01

## 2023-02-01 PROBLEM — F41.9 ANXIETY: Status: RESOLVED | Noted: 2021-07-22 | Resolved: 2023-02-01

## 2023-02-01 PROBLEM — M54.9 CHRONIC BACK PAIN: Status: RESOLVED | Noted: 2021-07-22 | Resolved: 2023-02-01

## 2023-02-01 PROCEDURE — 99214 OFFICE O/P EST MOD 30 MIN: CPT | Performed by: INTERNAL MEDICINE

## 2023-02-01 RX ORDER — ATORVASTATIN CALCIUM 10 MG/1
10 TABLET, FILM COATED ORAL DAILY
Qty: 90 TABLET | Refills: 1 | Status: SHIPPED | OUTPATIENT
Start: 2023-02-01

## 2023-02-01 RX ORDER — ATORVASTATIN CALCIUM 10 MG/1
10 TABLET, FILM COATED ORAL DAILY
COMMUNITY
End: 2023-02-01 | Stop reason: SDUPTHER

## 2023-02-01 RX ORDER — POLYETHYLENE GLYCOL-3350 AND ELECTROLYTES WITH FLAVOR PACK 240; 5.84; 2.98; 6.72; 22.72 G/278.26G; G/278.26G; G/278.26G; G/278.26G; G/278.26G
POWDER, FOR SOLUTION ORAL
Status: ON HOLD | COMMUNITY
Start: 2022-12-28 | End: 2023-02-21

## 2023-02-01 NOTE — PROGRESS NOTES
"Chief Complaint:  Heartburn and Follow-up (Pt states that this routine, he had labs on 1/16/2023/Gastro called and told him to stay away from alcohol and to watch his diet. )    Subjective          Jose Lyn presents to [facility]    History of present illness:  37-year-old male with underlying hyperlipidemia, mild intermittent asthma, strong family history of colon cancer, who was seen 7/21 as a New Patient in that his last physical was over 3 years prior, who is coming in now 2/23 for closer 3-month follow-up. We will go over his med list, review any recent labs obtained, and make further recommendations at that time.    Review of Systems   Constitutional: Negative for appetite change, fatigue and fever.   HENT: Negative for congestion and ear pain.    Eyes: Negative for blurred vision.   Respiratory: Negative for cough, chest tightness, shortness of breath and wheezing.    Cardiovascular: Negative for chest pain, palpitations and leg swelling.   Gastrointestinal: Negative for abdominal pain.   Genitourinary: Negative for difficulty urinating, dysuria and hematuria.   Musculoskeletal: Negative for arthralgias and gait problem.   Skin: Negative for skin lesions.   Neurological: Negative for syncope, memory problem and confusion.   Psychiatric/Behavioral: Negative for self-injury and depressed mood.       Objective   Vital Signs:   /82   Pulse 63   Temp 98.7 °F (37.1 °C) (Skin)   Ht 185.4 cm (72.99\")   Wt 104 kg (230 lb 3.2 oz)   SpO2 92%   BMI 30.38 kg/m²           Physical Exam  Vitals and nursing note reviewed.   Constitutional:       General: He is not in acute distress.     Appearance: Normal appearance. He is not toxic-appearing.   HENT:      Head: Atraumatic.      Right Ear: External ear normal.      Left Ear: External ear normal.      Nose: Nose normal.      Mouth/Throat:      Mouth: Mucous membranes are moist.   Eyes:      General:         Right eye: No " discharge.         Left eye: No discharge.      Extraocular Movements: Extraocular movements intact.      Pupils: Pupils are equal, round, and reactive to light.   Neck:      Vascular: No carotid bruit.      Comments: No carotid bruit.  Cardiovascular:      Rate and Rhythm: Normal rate and regular rhythm.      Pulses: Normal pulses.      Heart sounds: Normal heart sounds. No murmur heard.    No gallop.      Comments: Heart tones normal, no ectopy, no S3.  Pulmonary:      Effort: Pulmonary effort is normal. No respiratory distress.      Breath sounds: No wheezing, rhonchi or rales.      Comments: Lung fields clear bilaterally.  Abdominal:      General: There is no distension.      Palpations: Abdomen is soft. There is no mass.      Tenderness: There is no abdominal tenderness. There is no guarding.      Comments: No epigastric tenderness.   Musculoskeletal:         General: No swelling or tenderness.      Cervical back: No tenderness.      Right lower leg: No edema.      Left lower leg: No edema.      Comments: No edema.   Skin:     General: Skin is warm and dry.      Findings: No rash.   Neurological:      General: No focal deficit present.      Mental Status: He is alert and oriented to person, place, and time. Mental status is at baseline.      Motor: No weakness.      Gait: Gait normal.   Psychiatric:         Mood and Affect: Mood normal.         Thought Content: Thought content normal.          Result Review :   The following data was reviewed by: Tyron Jason MD on 07/23/2021:                  Assessment and Plan    Diagnoses and all orders for this visit:    1. Mixed hyperlipidemia (Primary)  Assessment & Plan:  LDL is elevated at 171, patient just recently resumed low-dose atorvastatin.  His elevated LFTs appear to be related to fatty liver, so I think it is reasonable to continue with the atorvastatin as written.  If he remains in this ballpark on return to office, we will need to titrate the  dose.    Orders:  -     Lipid Panel; Future  -     CK; Future    2. Hematochezia  Assessment & Plan:  Patient has not seen any more blood as of his 2/23 follow-up.  That certainly good to hear, he is scheduled for colonoscopy in regards to polyps and family history anyhow.      3. Elevated LFTs  Overview:  Liver ultrasound 1/23:  Hepatic steatosis.  Mild hepatomegaly.  Small 3 millimeter polyp along the anterior gallbladder wall.  No shadowing stones or evidence for cholecystitis.    Acute hepatitis panel negative 11/22.    Assessment & Plan:  Patient GI in regards to seeing blood in the stool, and I also evaluated liver enzymes.  These are still just mild to moderately elevated, ALT is 84.  The ultrasound showed fatty liver.  GI discussed with him he needs to back off on alcohol based on the findings above.    Orders:  -     Comprehensive Metabolic Panel; Future    4. Adenomatous polyp of colon, unspecified part of colon  Overview:  C-scope 2/18:  + Polyp/FH: CA = in B at age 38  5 years per Dr. Mccall        Assessment & Plan:  Patient is scheduled later this month with Dr. Mccall for his 5-year colonoscopy secondary to positive family history as well as history of polyps himself.      5. Iron overload  -     CBC & Differential; Future  -     Iron Profile; Future  -     Ferritin; Future    Other orders  -     atorvastatin (LIPITOR) 10 MG tablet; Take 1 tablet by mouth Daily.  Dispense: 90 tablet; Refill: 1      ANNUAL PHYSICAL 1/29/18:   = d/w all labs in detail; all good minus below; no CP/SOB at work; no new concerns;  --  LIPIDS witih , HDL 80's, but smoker with CAD F age 59, so continue to follow--->  and d/w diet please=get at least the 10 lbs off just put on and perhaps 20 total eventually; d/w diet drinks; f/u 6 mo now.  --  A.R. and no recent flares---> occ MDI use.  --  VIT D DEF per 7/15 labs and tx was started...rec lower to 2x/mo now--->29 on q mo and rec 2x/mo at 1/18 OV.  --  MAJOR  DEPRESSION '12 and again '14...no SI, has f/u with [facility]...resolved.  --  GERD w/o dysphagia=stable off med---> EGD '17 per Dr Mccall with ? PUD, tx'd with PPI, but stopped after f/u EGD.  HEMORRHOIDS and will julee with surgeon now=2/16--->no surgery rec.  --  FH= M/F/B/2 S...+ HTN/CVA/DM.  --  PSA  COLON julee 2/18...+polyp/FH+...per Dr Mccall <---FH+ COLON CA=40 yo Brother just dx'd ('14) and agree with screen ASAP  (; son born 8/21, 7 yo son 8/21; 2 S, B above, mom passed in 2020 with CHF/obesity.  Father living as of 11/21, has sickle cell trait, has had TIAs as well as history of CHF).    Follow Up   Return in about 4 months (around 6/1/2023).  Patient was given instructions and counseling regarding his condition or for health maintenance advice. Please see specific information pulled into the AVS if appropriate.

## 2023-02-01 NOTE — ASSESSMENT & PLAN NOTE
Patient GI in regards to seeing blood in the stool, and I also evaluated liver enzymes.  These are still just mild to moderately elevated, ALT is 84.  The ultrasound showed fatty liver.  GI discussed with him he needs to back off on alcohol based on the findings above.

## 2023-02-01 NOTE — ASSESSMENT & PLAN NOTE
LDL is elevated at 171, patient just recently resumed low-dose atorvastatin.  His elevated LFTs appear to be related to fatty liver, so I think it is reasonable to continue with the atorvastatin as written.  If he remains in this ballpark on return to office, we will need to titrate the dose.

## 2023-02-01 NOTE — ASSESSMENT & PLAN NOTE
Patient has not seen any more blood as of his 2/23 follow-up.  That certainly good to hear, he is scheduled for colonoscopy in regards to polyps and family history anyhow.

## 2023-02-01 NOTE — ASSESSMENT & PLAN NOTE
Patient is scheduled later this month with Dr. Mccall for his 5-year colonoscopy secondary to positive family history as well as history of polyps himself.

## 2023-02-21 ENCOUNTER — HOSPITAL ENCOUNTER (OUTPATIENT)
Facility: HOSPITAL | Age: 38
Setting detail: HOSPITAL OUTPATIENT SURGERY
Discharge: HOME | End: 2023-02-21
Attending: INTERNAL MEDICINE | Admitting: INTERNAL MEDICINE
Payer: COMMERCIAL

## 2023-02-21 ENCOUNTER — ANESTHESIA EVENT (OUTPATIENT)
Dept: GASTROENTEROLOGY | Facility: HOSPITAL | Age: 38
End: 2023-02-21
Payer: COMMERCIAL

## 2023-02-21 ENCOUNTER — ANESTHESIA (OUTPATIENT)
Dept: GASTROENTEROLOGY | Facility: HOSPITAL | Age: 38
End: 2023-02-21
Payer: COMMERCIAL

## 2023-02-21 VITALS
SYSTOLIC BLOOD PRESSURE: 131 MMHG | HEIGHT: 73 IN | BODY MASS INDEX: 29.69 KG/M2 | DIASTOLIC BLOOD PRESSURE: 102 MMHG | TEMPERATURE: 97.3 F | WEIGHT: 223.99 LBS | OXYGEN SATURATION: 99 % | HEART RATE: 65 BPM | RESPIRATION RATE: 17 BRPM

## 2023-02-21 DIAGNOSIS — K62.5 RECTAL BLEEDING: ICD-10-CM

## 2023-02-21 DIAGNOSIS — Z80.0 FAMILY HISTORY OF COLON CANCER: ICD-10-CM

## 2023-02-21 DIAGNOSIS — R12 HEARTBURN: ICD-10-CM

## 2023-02-21 PROCEDURE — 45385 COLONOSCOPY W/LESION REMOVAL: CPT | Performed by: INTERNAL MEDICINE

## 2023-02-21 PROCEDURE — 25010000002 PROPOFOL 10 MG/ML EMULSION: Performed by: NURSE ANESTHETIST, CERTIFIED REGISTERED

## 2023-02-21 PROCEDURE — 88305 TISSUE EXAM BY PATHOLOGIST: CPT | Performed by: INTERNAL MEDICINE

## 2023-02-21 PROCEDURE — 25010000002 MIDAZOLAM PER 1 MG: Performed by: ANESTHESIOLOGY

## 2023-02-21 PROCEDURE — 43239 EGD BIOPSY SINGLE/MULTIPLE: CPT | Performed by: INTERNAL MEDICINE

## 2023-02-21 RX ORDER — PROPOFOL 10 MG/ML
VIAL (ML) INTRAVENOUS AS NEEDED
Status: DISCONTINUED | OUTPATIENT
Start: 2023-02-21 | End: 2023-02-21 | Stop reason: SURG

## 2023-02-21 RX ORDER — LIDOCAINE HYDROCHLORIDE 20 MG/ML
INJECTION, SOLUTION EPIDURAL; INFILTRATION; INTRACAUDAL; PERINEURAL AS NEEDED
Status: DISCONTINUED | OUTPATIENT
Start: 2023-02-21 | End: 2023-02-21 | Stop reason: SURG

## 2023-02-21 RX ORDER — SODIUM CHLORIDE, SODIUM LACTATE, POTASSIUM CHLORIDE, CALCIUM CHLORIDE 600; 310; 30; 20 MG/100ML; MG/100ML; MG/100ML; MG/100ML
30 INJECTION, SOLUTION INTRAVENOUS CONTINUOUS
Status: DISCONTINUED | OUTPATIENT
Start: 2023-02-21 | End: 2023-02-21 | Stop reason: HOSPADM

## 2023-02-21 RX ORDER — GLYCOPYRROLATE 0.2 MG/ML
INJECTION INTRAMUSCULAR; INTRAVENOUS AS NEEDED
Status: DISCONTINUED | OUTPATIENT
Start: 2023-02-21 | End: 2023-02-21 | Stop reason: SURG

## 2023-02-21 RX ORDER — MIDAZOLAM HYDROCHLORIDE 1 MG/ML
2 INJECTION INTRAMUSCULAR; INTRAVENOUS ONCE
Status: COMPLETED | OUTPATIENT
Start: 2023-02-21 | End: 2023-02-21

## 2023-02-21 RX ADMIN — MIDAZOLAM HYDROCHLORIDE 2 MG: 2 INJECTION, SOLUTION INTRAMUSCULAR; INTRAVENOUS at 13:06

## 2023-02-21 RX ADMIN — PROPOFOL 100 MG: 10 INJECTION, EMULSION INTRAVENOUS at 14:10

## 2023-02-21 RX ADMIN — SODIUM CHLORIDE, POTASSIUM CHLORIDE, SODIUM LACTATE AND CALCIUM CHLORIDE 30 ML/HR: 600; 310; 30; 20 INJECTION, SOLUTION INTRAVENOUS at 13:06

## 2023-02-21 RX ADMIN — LIDOCAINE HYDROCHLORIDE 200 MG: 20 INJECTION, SOLUTION EPIDURAL; INFILTRATION; INTRACAUDAL; PERINEURAL at 14:10

## 2023-02-21 RX ADMIN — GLYCOPYRROLATE 0.1 MG: 0.2 INJECTION INTRAMUSCULAR; INTRAVENOUS at 14:24

## 2023-02-21 RX ADMIN — PROPOFOL 350 MCG/KG/MIN: 10 INJECTION, EMULSION INTRAVENOUS at 14:10
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
## 2023-02-21 NOTE — ANESTHESIA POSTPROCEDURE EVALUATION
Patient: Jose Lyn    Procedure Summary     Date: 02/21/23 Room / Location: [facility] ENDOSCOPY 4 / [facility] ENDOSCOPY    Anesthesia Start: 1409 Anesthesia Stop: 1453    Procedures:       ESOPHAGOGASTRODUODENOSCOPY with biopsies      COLONOSCOPY with polypectomy with cold snare Diagnosis:       Family history of colon cancer      Rectal bleeding      Heartburn      (Family history of colon cancer [Z80.0])      (Rectal bleeding [K62.5])      (Heartburn [R12])    Surgeons: Price Mccall MD Provider: Drew Hale MD    Anesthesia Type: general ASA Status: 2          Anesthesia Type: general    Vitals  Vitals Value Taken Time   /102 02/21/23 1459   Temp 36.3 °C (97.3 °F) 02/21/23 1459   Pulse 67 02/21/23 1500   Resp 17 02/21/23 1459   SpO2 99 % 02/21/23 1500   Vitals shown include unvalidated device data.        Post Anesthesia Care and Evaluation    Patient location during evaluation: bedside  Patient participation: complete - patient participated  Level of consciousness: awake  Pain management: adequate    Airway patency: patent  Anesthetic complications: No anesthetic complications  PONV Status: none  Cardiovascular status: acceptable and stable  Respiratory status: acceptable  Hydration status: acceptable    Comments: An Anesthesiologist personally participated in the most demanding procedures (including induction and emergence if applicable) in the anesthesia plan, monitored the course of anesthesia administration at frequent intervals and remained physically present and available for immediate diagnosis and treatment of emergencies.

## 2023-02-21 NOTE — ANESTHESIA PREPROCEDURE EVALUATION
Anesthesia Evaluation     Patient summary reviewed and Nursing notes reviewed                Airway   Mallampati: I  TM distance: >3 FB  Neck ROM: full  No difficulty expected  Dental      Pulmonary - negative pulmonary ROS and normal exam    breath sounds clear to auscultation  Cardiovascular - normal exam    Rhythm: regular  Rate: normal    (+) hyperlipidemia,       Neuro/Psych- negative ROS  GI/Hepatic/Renal/Endo    (+)  GI bleeding ,     Musculoskeletal (-) negative ROS    Abdominal    Substance History - negative use     OB/GYN negative ob/gyn ROS         Other                        Anesthesia Plan    ASA 2     general     intravenous induction     Anesthetic plan, risks, benefits, and alternatives have been provided, discussed and informed consent has been obtained with: patient.        CODE STATUS:

## 2023-02-23 LAB
CYTO UR: NORMAL
LAB AP CASE REPORT: NORMAL
LAB AP CLINICAL INFORMATION: NORMAL
PATH REPORT.FINAL DX SPEC: NORMAL
PATH REPORT.GROSS SPEC: NORMAL

## 2023-02-27 NOTE — PROGRESS NOTES
EGD 2/21/2023: Normal esophagus-biopsy negative, normal stomach, normal duodenum    Colonoscopy 2/21/2023: Good prep, small polyp in the rectum removed, diverticulosis, internal hemorrhoids.  There were no worrisome findings    Repeat colonoscopy 5 years    Keep follow-up

## 2023-02-28 ENCOUNTER — OFFICE VISIT (OUTPATIENT)
Dept: GASTROENTEROLOGY | Facility: CLINIC | Age: 38
End: 2023-02-28
Payer: COMMERCIAL

## 2023-02-28 VITALS
SYSTOLIC BLOOD PRESSURE: 142 MMHG | DIASTOLIC BLOOD PRESSURE: 99 MMHG | BODY MASS INDEX: 30.3 KG/M2 | HEIGHT: 73 IN | WEIGHT: 228.6 LBS | HEART RATE: 82 BPM

## 2023-02-28 DIAGNOSIS — Z80.0 FAMILY HISTORY OF COLON CANCER: ICD-10-CM

## 2023-02-28 DIAGNOSIS — Z78.9 ALCOHOL USE: ICD-10-CM

## 2023-02-28 DIAGNOSIS — K62.1 POLYP OF RECTUM: ICD-10-CM

## 2023-02-28 DIAGNOSIS — K76.0 FATTY LIVER: ICD-10-CM

## 2023-02-28 DIAGNOSIS — E66.9 CLASS 1 OBESITY WITH SERIOUS COMORBIDITY AND BODY MASS INDEX (BMI) OF 30.0 TO 30.9 IN ADULT, UNSPECIFIED OBESITY TYPE: ICD-10-CM

## 2023-02-28 DIAGNOSIS — K82.4 GALLBLADDER POLYP: ICD-10-CM

## 2023-02-28 DIAGNOSIS — R79.89 ELEVATED LFTS: Primary | ICD-10-CM

## 2023-02-28 DIAGNOSIS — R12 HEARTBURN: ICD-10-CM

## 2023-02-28 PROCEDURE — 99214 OFFICE O/P EST MOD 30 MIN: CPT | Performed by: NURSE PRACTITIONER
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
## 2023-05-09 ENCOUNTER — TELEPHONE (OUTPATIENT)
Dept: GASTROENTEROLOGY | Facility: CLINIC | Age: 38
End: 2023-05-09
Payer: COMMERCIAL

## 2023-05-23 ENCOUNTER — TELEPHONE (OUTPATIENT)
Dept: INTERNAL MEDICINE | Facility: CLINIC | Age: 38
End: 2023-05-23
Payer: COMMERCIAL

## 2023-05-23 DIAGNOSIS — M25.512 CHRONIC LEFT SHOULDER PAIN: Primary | ICD-10-CM

## 2023-05-23 DIAGNOSIS — G89.29 CHRONIC LEFT SHOULDER PAIN: Primary | ICD-10-CM

## 2023-05-23 NOTE — TELEPHONE ENCOUNTER
Pt is having pain with his left shoulder again, he is wanting to know if you will referral him to PTA. I have referral pended if you agree. Thanks.

## 2023-06-07 ENCOUNTER — APPOINTMENT (OUTPATIENT)
Dept: CARDIOLOGY | Facility: HOSPITAL | Age: 38
End: 2023-06-07
Payer: COMMERCIAL

## 2023-06-07 ENCOUNTER — HOSPITAL ENCOUNTER (EMERGENCY)
Facility: HOSPITAL | Age: 38
Discharge: HOME | End: 2023-06-07
Attending: EMERGENCY MEDICINE
Payer: COMMERCIAL

## 2023-06-07 VITALS
TEMPERATURE: 99.1 F | DIASTOLIC BLOOD PRESSURE: 104 MMHG | SYSTOLIC BLOOD PRESSURE: 142 MMHG | HEIGHT: 73 IN | RESPIRATION RATE: 20 BRPM | WEIGHT: 215.17 LBS | HEART RATE: 88 BPM | BODY MASS INDEX: 28.52 KG/M2 | OXYGEN SATURATION: 96 %

## 2023-06-07 DIAGNOSIS — L03.115 CELLULITIS OF RIGHT LOWER LEG: ICD-10-CM

## 2023-06-07 DIAGNOSIS — L03.115 CELLULITIS OF RIGHT LEG: Primary | ICD-10-CM

## 2023-06-07 LAB
ALBUMIN SERPL-MCNC: 4.6 G/DL (ref 3.5–5.2)
ALBUMIN/GLOB SERPL: 1.4 G/DL
ALP SERPL-CCNC: 75 U/L (ref 39–117)
ALT SERPL W P-5'-P-CCNC: 85 U/L (ref 1–41)
ANION GAP SERPL CALCULATED.3IONS-SCNC: 12.9 MMOL/L (ref 5–15)
AST SERPL-CCNC: 59 U/L (ref 1–40)
BASOPHILS # BLD AUTO: 0.09 10*3/MM3 (ref 0–0.2)
BASOPHILS NFR BLD AUTO: 1.9 % (ref 0–1.5)
BH CV LOWER VASCULAR LEFT COMMON FEMORAL AUGMENT: NORMAL
BH CV LOWER VASCULAR LEFT COMMON FEMORAL COMPETENT: NORMAL
BH CV LOWER VASCULAR LEFT COMMON FEMORAL COMPRESS: NORMAL
BH CV LOWER VASCULAR LEFT COMMON FEMORAL PHASIC: NORMAL
BH CV LOWER VASCULAR LEFT COMMON FEMORAL SPONT: NORMAL
BH CV LOWER VASCULAR RIGHT COMMON FEMORAL AUGMENT: NORMAL
BH CV LOWER VASCULAR RIGHT COMMON FEMORAL COMPETENT: NORMAL
BH CV LOWER VASCULAR RIGHT COMMON FEMORAL COMPRESS: NORMAL
BH CV LOWER VASCULAR RIGHT COMMON FEMORAL PHASIC: NORMAL
BH CV LOWER VASCULAR RIGHT COMMON FEMORAL SPONT: NORMAL
BH CV LOWER VASCULAR RIGHT DISTAL FEMORAL COMPRESS: NORMAL
BH CV LOWER VASCULAR RIGHT GASTRONEMIUS COMPRESS: NORMAL
BH CV LOWER VASCULAR RIGHT GREATER SAPH AK COMPRESS: NORMAL
BH CV LOWER VASCULAR RIGHT GREATER SAPH BK COMPRESS: NORMAL
BH CV LOWER VASCULAR RIGHT LESSER SAPH COMPRESS: NORMAL
BH CV LOWER VASCULAR RIGHT MID FEMORAL AUGMENT: NORMAL
BH CV LOWER VASCULAR RIGHT MID FEMORAL COMPETENT: NORMAL
BH CV LOWER VASCULAR RIGHT MID FEMORAL COMPRESS: NORMAL
BH CV LOWER VASCULAR RIGHT MID FEMORAL PHASIC: NORMAL
BH CV LOWER VASCULAR RIGHT MID FEMORAL SPONT: NORMAL
BH CV LOWER VASCULAR RIGHT PERONEAL COMPRESS: NORMAL
BH CV LOWER VASCULAR RIGHT POPLITEAL AUGMENT: NORMAL
BH CV LOWER VASCULAR RIGHT POPLITEAL COMPETENT: NORMAL
BH CV LOWER VASCULAR RIGHT POPLITEAL COMPRESS: NORMAL
BH CV LOWER VASCULAR RIGHT POPLITEAL PHASIC: NORMAL
BH CV LOWER VASCULAR RIGHT POPLITEAL SPONT: NORMAL
BH CV LOWER VASCULAR RIGHT POSTERIOR TIBIAL COMPRESS: NORMAL
BH CV LOWER VASCULAR RIGHT PROXIMAL FEMORAL COMPRESS: NORMAL
BH CV LOWER VASCULAR RIGHT SAPHENOFEMORAL JUNCTION COMPRESS: NORMAL
BH CV VAS PRELIMINARY FINDINGS SCRIPTING: 1
BILIRUB SERPL-MCNC: 0.2 MG/DL (ref 0–1.2)
BUN SERPL-MCNC: 7 MG/DL (ref 6–20)
BUN/CREAT SERPL: 9.1 (ref 7–25)
CALCIUM SPEC-SCNC: 9.3 MG/DL (ref 8.6–10.5)
CHLORIDE SERPL-SCNC: 100 MMOL/L (ref 98–107)
CO2 SERPL-SCNC: 21.1 MMOL/L (ref 22–29)
CREAT SERPL-MCNC: 0.77 MG/DL (ref 0.76–1.27)
CRP SERPL-MCNC: 1.06 MG/DL (ref 0–0.5)
DACRYOCYTES BLD QL SMEAR: NORMAL
DEPRECATED RDW RBC AUTO: 36.8 FL (ref 37–54)
EGFRCR SERPLBLD CKD-EPI 2021: 117.5 ML/MIN/1.73
EOSINOPHIL # BLD AUTO: 0.2 10*3/MM3 (ref 0–0.4)
EOSINOPHIL NFR BLD AUTO: 4.3 % (ref 0.3–6.2)
ERYTHROCYTE [DISTWIDTH] IN BLOOD BY AUTOMATED COUNT: 13 % (ref 12.3–15.4)
ERYTHROCYTE [SEDIMENTATION RATE] IN BLOOD: 5 MM/HR (ref 0–15)
GLOBULIN UR ELPH-MCNC: 3.2 GM/DL
GLUCOSE SERPL-MCNC: 114 MG/DL (ref 65–99)
HCT VFR BLD AUTO: 45.8 % (ref 37.5–51)
HGB BLD-MCNC: 17.1 G/DL (ref 13–17.7)
HOLD SPECIMEN: NORMAL
HOLD SPECIMEN: NORMAL
IMM GRANULOCYTES # BLD AUTO: 0.02 10*3/MM3 (ref 0–0.05)
IMM GRANULOCYTES NFR BLD AUTO: 0.4 % (ref 0–0.5)
LYMPHOCYTES # BLD AUTO: 1.32 10*3/MM3 (ref 0.7–3.1)
LYMPHOCYTES NFR BLD AUTO: 28.1 % (ref 19.6–45.3)
MCH RBC QN AUTO: 29.7 PG (ref 26.6–33)
MCHC RBC AUTO-ENTMCNC: 37.3 G/DL (ref 31.5–35.7)
MCV RBC AUTO: 79.7 FL (ref 79–97)
MONOCYTES # BLD AUTO: 0.65 10*3/MM3 (ref 0.1–0.9)
MONOCYTES NFR BLD AUTO: 13.8 % (ref 5–12)
NEUTROPHILS NFR BLD AUTO: 2.42 10*3/MM3 (ref 1.7–7)
NEUTROPHILS NFR BLD AUTO: 51.5 % (ref 42.7–76)
NRBC BLD AUTO-RTO: 0 /100 WBC (ref 0–0.2)
PLAT MORPH BLD: NORMAL
PLATELET # BLD AUTO: 184 10*3/MM3 (ref 140–450)
PMV BLD AUTO: 10.2 FL (ref 6–12)
POTASSIUM SERPL-SCNC: 4.2 MMOL/L (ref 3.5–5.2)
PROT SERPL-MCNC: 7.8 G/DL (ref 6–8.5)
RBC # BLD AUTO: 5.75 10*6/MM3 (ref 4.14–5.8)
SODIUM SERPL-SCNC: 134 MMOL/L (ref 136–145)
TARGETS BLD QL SMEAR: NORMAL
WBC MORPH BLD: NORMAL
WBC NRBC COR # BLD: 4.7 10*3/MM3 (ref 3.4–10.8)
WHOLE BLOOD HOLD COAG: NORMAL
WHOLE BLOOD HOLD SPECIMEN: NORMAL

## 2023-06-07 PROCEDURE — 93971 EXTREMITY STUDY: CPT | Performed by: SURGERY

## 2023-06-07 PROCEDURE — 99282 EMERGENCY DEPT VISIT SF MDM: CPT

## 2023-06-07 PROCEDURE — 36415 COLL VENOUS BLD VENIPUNCTURE: CPT

## 2023-06-07 PROCEDURE — 85652 RBC SED RATE AUTOMATED: CPT

## 2023-06-07 PROCEDURE — 80053 COMPREHEN METABOLIC PANEL: CPT | Performed by: EMERGENCY MEDICINE

## 2023-06-07 PROCEDURE — 86140 C-REACTIVE PROTEIN: CPT | Performed by: EMERGENCY MEDICINE

## 2023-06-07 PROCEDURE — 93971 EXTREMITY STUDY: CPT

## 2023-06-07 PROCEDURE — 85007 BL SMEAR W/DIFF WBC COUNT: CPT | Performed by: EMERGENCY MEDICINE

## 2023-06-07 PROCEDURE — 85025 COMPLETE CBC W/AUTO DIFF WBC: CPT | Performed by: EMERGENCY MEDICINE

## 2023-06-07 RX ORDER — CEPHALEXIN 500 MG/1
500 CAPSULE ORAL 4 TIMES DAILY
Qty: 28 CAPSULE | Refills: 0 | Status: SHIPPED | OUTPATIENT
Start: 2023-06-07 | End: 2023-06-15 | Stop reason: SDUPTHER
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
## 2023-06-07 NOTE — ED TRIAGE NOTES
Pt to ED from home with reports of right shin pain.      Pt seen at Urgent Care and was dx with cellulitis and was instructed to come to ED for blood clot r/o if pain persisted.      Pt states two days ago he developed pain with redness and swelling to area.

## 2023-06-07 NOTE — Clinical Note
[facility] EMERGENCY ROOM  [address]  [place] [address]  Phone: [phone]    Jose Lyn was seen and treated in our emergency department on 6/7/2023.  He may return to work on 06/09/2023.  ?       Thank you for choosing [facility].    Lazaro Rashid MD

## 2023-06-07 NOTE — DISCHARGE INSTRUCTIONS
Your ultrasound came back negative for any blood clots in the right leg.    You should keep taking the Keflex, but make sure it is 4 times a day to properly treat infection in the leg.    Take some ibuprofen or Tylenol for pain and give it a few more days and it should start clearing up.    Return to the ER if you start spiking fevers or cold chills or if the redness starts spreading significantly or above the knee.

## 2023-06-15 ENCOUNTER — OFFICE VISIT (OUTPATIENT)
Dept: INTERNAL MEDICINE | Facility: CLINIC | Age: 38
End: 2023-06-15
Payer: COMMERCIAL

## 2023-06-15 VITALS
SYSTOLIC BLOOD PRESSURE: 150 MMHG | BODY MASS INDEX: 28.86 KG/M2 | WEIGHT: 217.8 LBS | HEIGHT: 73 IN | DIASTOLIC BLOOD PRESSURE: 100 MMHG | OXYGEN SATURATION: 93 % | HEART RATE: 83 BPM | TEMPERATURE: 98 F

## 2023-06-15 DIAGNOSIS — L03.115 CELLULITIS OF RIGHT LOWER LEG: ICD-10-CM

## 2023-06-15 DIAGNOSIS — Z00.00 WELL ADULT EXAM: ICD-10-CM

## 2023-06-15 DIAGNOSIS — E78.2 MIXED HYPERLIPIDEMIA: Primary | ICD-10-CM

## 2023-06-15 DIAGNOSIS — L03.115 CELLULITIS OF RIGHT LOWER EXTREMITY: ICD-10-CM

## 2023-06-15 DIAGNOSIS — R79.89 ELEVATED LFTS: ICD-10-CM

## 2023-06-15 PROBLEM — R53.83 OTHER FATIGUE: Status: RESOLVED | Noted: 2021-11-24 | Resolved: 2023-06-15

## 2023-06-15 PROBLEM — K62.5 RECTAL BLEEDING: Status: RESOLVED | Noted: 2022-12-28 | Resolved: 2023-06-15

## 2023-06-15 PROBLEM — S22.32XD CLOSED FRACTURE OF ONE RIB OF LEFT SIDE WITH ROUTINE HEALING: Status: RESOLVED | Noted: 2021-07-23 | Resolved: 2023-06-15

## 2023-06-15 PROBLEM — K92.1 HEMATOCHEZIA: Status: RESOLVED | Noted: 2022-11-01 | Resolved: 2023-06-15

## 2023-06-15 PROBLEM — R03.0 ELEVATED BLOOD PRESSURE READING IN OFFICE WITHOUT DIAGNOSIS OF HYPERTENSION: Status: RESOLVED | Noted: 2021-11-24 | Resolved: 2023-06-15

## 2023-06-15 PROBLEM — R12 HEARTBURN: Status: RESOLVED | Noted: 2022-12-28 | Resolved: 2023-06-15

## 2023-06-15 PROBLEM — D12.6 ADENOMATOUS POLYP OF COLON: Status: RESOLVED | Noted: 2021-11-24 | Resolved: 2023-06-15

## 2023-06-15 RX ORDER — ATORVASTATIN CALCIUM 10 MG/1
10 TABLET, FILM COATED ORAL DAILY
Qty: 90 TABLET | Refills: 1 | Status: SHIPPED | OUTPATIENT
Start: 2023-06-15

## 2023-06-15 RX ORDER — CEPHALEXIN 500 MG/1
500 CAPSULE ORAL 4 TIMES DAILY
Qty: 28 CAPSULE | Refills: 0 | Status: SHIPPED | OUTPATIENT
Start: 2023-06-15 | End: 2023-06-22

## 2023-06-15 NOTE — ASSESSMENT & PLAN NOTE
Patient was seen and her for this about a week ago.  He had some discoloration and warmth and swelling and pain in the right pretibial region.  He did not recall any trauma, there has been no bite that he was aware of at least.  He was seen in the ER, his white count was all right, his CRP was mildly elevated.  Patient's been on Keflex for the past week with some improvement, but is still much swollen and painful.  We will continue a longer duration of treatment with the antibiotic, patient to call for new symptoms.

## 2023-06-15 NOTE — PROGRESS NOTES
"Chief Complaint:  Hyperlipidemia, Follow-up (Pt states that this is routine, he didn't do labs. ), and Cellulitis of Right lower exteremity (Pt went to ER, he is on antibiotic for this. He states that the color has went away, but it is still swollen and painful. )    Subjective          Jose Lyn presents to [facility]    History of present illness:  37-year-old male with underlying hyperlipidemia, mild intermittent asthma, strong family history of colon cancer, seen 7/21 as New Patient in that his last physical was over 3 years prior, who is coming in now 6/23 for closer 4-month follow-up. We will go over his med list, review any recent labs obtained, and make further recommendations at that time.    Review of Systems   Constitutional:  Negative for appetite change, fatigue and fever.   HENT:  Negative for congestion and ear pain.    Eyes:  Negative for blurred vision.   Respiratory:  Negative for cough, chest tightness, shortness of breath and wheezing.    Cardiovascular:  Negative for chest pain, palpitations and leg swelling.   Gastrointestinal:  Negative for abdominal pain.   Genitourinary:  Negative for difficulty urinating, dysuria and hematuria.   Musculoskeletal:  Negative for arthralgias and gait problem.   Skin:  Negative for skin lesions.   Neurological:  Negative for syncope, memory problem and confusion.   Psychiatric/Behavioral:  Negative for self-injury and depressed mood.      Objective   Vital Signs:   /100   Pulse 83   Temp 98 °F (36.7 °C) (Skin)   Ht 185.4 cm (72.99\")   Wt 98.8 kg (217 lb 12.8 oz)   SpO2 93%   BMI 28.74 kg/m²           Physical Exam  Vitals and nursing note reviewed.   Constitutional:       General: He is not in acute distress.     Appearance: Normal appearance. He is not toxic-appearing.   HENT:      Head: Atraumatic.      Right Ear: External ear normal.      Left Ear: External ear normal.      Nose: Nose normal.      " Mouth/Throat:      Mouth: Mucous membranes are moist.   Eyes:      General:         Right eye: No discharge.         Left eye: No discharge.      Extraocular Movements: Extraocular movements intact.      Pupils: Pupils are equal, round, and reactive to light.   Neck:      Vascular: No carotid bruit.      Comments: No carotid bruit.  Cardiovascular:      Rate and Rhythm: Normal rate and regular rhythm.      Pulses: Normal pulses.      Heart sounds: Normal heart sounds. No murmur heard.    No gallop.      Comments: Heart tones normal, no ectopy, no S3.  Pulmonary:      Effort: Pulmonary effort is normal. No respiratory distress.      Breath sounds: No wheezing, rhonchi or rales.      Comments: Lung fields clear bilaterally.  Abdominal:      General: There is no distension.      Palpations: Abdomen is soft. There is no mass.      Tenderness: There is no abdominal tenderness. There is no guarding.      Comments: No epigastric tenderness.   Musculoskeletal:         General: No swelling or tenderness.      Cervical back: No tenderness.      Right lower leg: No edema.      Left lower leg: No edema.      Comments: No edema.   Skin:     General: Skin is warm and dry.      Findings: No rash.   Neurological:      General: No focal deficit present.      Mental Status: He is alert and oriented to person, place, and time. Mental status is at baseline.      Motor: No weakness.      Gait: Gait normal.   Psychiatric:         Mood and Affect: Mood normal.         Thought Content: Thought content normal.        Result Review :   The following data was reviewed by: Tyron Jason MD on 07/23/2021:                  Assessment and Plan    Diagnoses and all orders for this visit:    1. Mixed hyperlipidemia (Primary)  Assessment & Plan:  No labs as of 6/23 OV.  We will continue his prescription and repeat labs on return to office in about 6 months.    Orders:  -     Lipid Panel; Future    2. Elevated LFTs  Overview:  Liver ultrasound  1/23:  Hepatic steatosis.  Mild hepatomegaly.  Small 3 millimeter polyp along the anterior gallbladder wall.  No shadowing stones or evidence for cholecystitis.    Acute hepatitis panel negative 11/22.    Orders:  -     Comprehensive Metabolic Panel; Future    3. Cellulitis of right lower leg  -     cephalexin (KEFLEX) 500 MG capsule; Take 1 capsule by mouth 4 (Four) Times a Day for 7 days.  Dispense: 28 capsule; Refill: 0    4. Cellulitis of right lower extremity  Assessment & Plan:  Patient was seen and her for this about a week ago.  He had some discoloration and warmth and swelling and pain in the right pretibial region.  He did not recall any trauma, there has been no bite that he was aware of at least.  He was seen in the ER, his white count was all right, his CRP was mildly elevated.  Patient's been on Keflex for the past week with some improvement, but is still much swollen and painful.  We will continue a longer duration of treatment with the antibiotic, patient to call for new symptoms.      5. Well adult exam  -     CBC & Differential; Future  -     Ferritin; Future  -     Iron Profile; Future  -     TSH+Free T4; Future  -     Vitamin B12 anemia; Future  -     Folate anemia; Future    Other orders  -     atorvastatin (LIPITOR) 10 MG tablet; Take 1 tablet by mouth Daily.  Dispense: 90 tablet; Refill: 1        ANNUAL PHYSICAL 1/29/18:   = d/w all labs in detail; all good minus below; no CP/SOB at work; no new concerns;  --  LIPIDS witih , HDL 80's, but smoker with CAD F age 59, so continue to follow--->  and d/w diet please=get at least the 10 lbs off just put on and perhaps 20 total eventually; d/w diet drinks; f/u 6 mo now.  --  A.R. and no recent flares---> occ MDI use.  --  VIT D DEF per 7/15 labs and tx was started...rec lower to 2x/mo now--->29 on q mo and rec 2x/mo at 1/18 OV.  --  MAJOR DEPRESSION '12 and again '14...no SI, has f/u with [facility]...resolved.  --  GERD w/o  dysphagia=stable off med---> EGD '17 per Dr Mccall with ? PUD, tx'd with PPI, but stopped after f/u EGD.  HEMORRHOIDS and will julee with surgeon now=2/16--->no surgery rec.  --  FH= M/F/B/2 S...+ HTN/CVA/DM.  --  PSA  COLON julee 2/18...+polyp/FH+...per Dr Mccall <---FH+ COLON CA=38 yo Brother just dx'd ('14) and agree with screen ASAP  (; son born 8/21, 7 yo son 8/21; 2 S, B above, mom passed in 2020 with CHF/obesity.  Father living as of 11/21, has sickle cell trait, has had TIAs as well as history of CHF).    Follow Up   Return in about 6 months (around 12/15/2023).  Patient was given instructions and counseling regarding his condition or for health maintenance advice. Please see specific information pulled into the AVS if appropriate.

## 2023-06-15 NOTE — ASSESSMENT & PLAN NOTE
No labs as of 6/23 OV.  We will continue his prescription and repeat labs on return to office in about 6 months.

## 2023-08-05 ENCOUNTER — HOSPITAL ENCOUNTER (INPATIENT)
Facility: HOSPITAL | Age: 38
LOS: 3 days | Discharge: HOME | DRG: 392 | End: 2023-08-08
Attending: EMERGENCY MEDICINE | Admitting: INTERNAL MEDICINE
Payer: COMMERCIAL

## 2023-08-05 ENCOUNTER — APPOINTMENT (OUTPATIENT)
Dept: CT IMAGING | Facility: HOSPITAL | Age: 38
DRG: 392 | End: 2023-08-05
Payer: COMMERCIAL

## 2023-08-05 DIAGNOSIS — R10.32 ABDOMINAL PAIN, ACUTE, LEFT LOWER QUADRANT: ICD-10-CM

## 2023-08-05 DIAGNOSIS — K57.20 DIVERTICULITIS OF COLON WITH PERFORATION: Primary | ICD-10-CM

## 2023-08-05 LAB
027 TOXIN: NORMAL
ALBUMIN SERPL-MCNC: 4.2 G/DL (ref 3.5–5.2)
ALBUMIN/GLOB SERPL: 1 G/DL
ALP SERPL-CCNC: 85 U/L (ref 39–117)
ALT SERPL W P-5'-P-CCNC: 54 U/L (ref 1–41)
ANION GAP SERPL CALCULATED.3IONS-SCNC: 12.9 MMOL/L (ref 5–15)
AST SERPL-CCNC: 58 U/L (ref 1–40)
BACTERIA UR QL AUTO: ABNORMAL /HPF
BASOPHILS # BLD AUTO: 0.06 10*3/MM3 (ref 0–0.2)
BASOPHILS NFR BLD AUTO: 0.8 % (ref 0–1.5)
BILIRUB SERPL-MCNC: 0.3 MG/DL (ref 0–1.2)
BILIRUB UR QL STRIP: ABNORMAL
BUN SERPL-MCNC: 11 MG/DL (ref 6–20)
BUN/CREAT SERPL: 13.6 (ref 7–25)
C DIFF TOX GENS STL QL NAA+PROBE: NEGATIVE
CALCIUM SPEC-SCNC: 9.5 MG/DL (ref 8.6–10.5)
CHLORIDE SERPL-SCNC: 99 MMOL/L (ref 98–107)
CLARITY UR: CLEAR
CO2 SERPL-SCNC: 25.1 MMOL/L (ref 22–29)
COLOR UR: ABNORMAL
CREAT SERPL-MCNC: 0.81 MG/DL (ref 0.76–1.27)
DEPRECATED RDW RBC AUTO: 36 FL (ref 37–54)
EGFRCR SERPLBLD CKD-EPI 2021: 115.7 ML/MIN/1.73
EOSINOPHIL # BLD AUTO: 0.14 10*3/MM3 (ref 0–0.4)
EOSINOPHIL NFR BLD AUTO: 1.8 % (ref 0.3–6.2)
ERYTHROCYTE [DISTWIDTH] IN BLOOD BY AUTOMATED COUNT: 12.7 % (ref 12.3–15.4)
GLOBULIN UR ELPH-MCNC: 4.2 GM/DL
GLUCOSE SERPL-MCNC: 158 MG/DL (ref 65–99)
GLUCOSE UR STRIP-MCNC: NEGATIVE MG/DL
HCT VFR BLD AUTO: 39.6 % (ref 37.5–51)
HGB BLD-MCNC: 14.6 G/DL (ref 13–17.7)
HGB UR QL STRIP.AUTO: NEGATIVE
HOLD SPECIMEN: NORMAL
HOLD SPECIMEN: NORMAL
HYALINE CASTS UR QL AUTO: ABNORMAL /LPF
IMM GRANULOCYTES # BLD AUTO: 0.08 10*3/MM3 (ref 0–0.05)
IMM GRANULOCYTES NFR BLD AUTO: 1 % (ref 0–0.5)
KETONES UR QL STRIP: ABNORMAL
LEUKOCYTE ESTERASE UR QL STRIP.AUTO: ABNORMAL
LIPASE SERPL-CCNC: 27 U/L (ref 13–60)
LYMPHOCYTES # BLD AUTO: 1.34 10*3/MM3 (ref 0.7–3.1)
LYMPHOCYTES NFR BLD AUTO: 17 % (ref 19.6–45.3)
MCH RBC QN AUTO: 29.1 PG (ref 26.6–33)
MCHC RBC AUTO-ENTMCNC: 36.9 G/DL (ref 31.5–35.7)
MCV RBC AUTO: 79 FL (ref 79–97)
MONOCYTES # BLD AUTO: 1.29 10*3/MM3 (ref 0.1–0.9)
MONOCYTES NFR BLD AUTO: 16.4 % (ref 5–12)
NEUTROPHILS NFR BLD AUTO: 4.96 10*3/MM3 (ref 1.7–7)
NEUTROPHILS NFR BLD AUTO: 63 % (ref 42.7–76)
NITRITE UR QL STRIP: NEGATIVE
NRBC BLD AUTO-RTO: 0 /100 WBC (ref 0–0.2)
PH UR STRIP.AUTO: 5.5 [PH] (ref 5–8)
PLATELET # BLD AUTO: 275 10*3/MM3 (ref 140–450)
PMV BLD AUTO: 9.5 FL (ref 6–12)
POTASSIUM SERPL-SCNC: 3.8 MMOL/L (ref 3.5–5.2)
PROT SERPL-MCNC: 8.4 G/DL (ref 6–8.5)
PROT UR QL STRIP: ABNORMAL
RBC # BLD AUTO: 5.01 10*6/MM3 (ref 4.14–5.8)
RBC # UR STRIP: ABNORMAL /HPF
REF LAB TEST METHOD: ABNORMAL
SODIUM SERPL-SCNC: 137 MMOL/L (ref 136–145)
SP GR UR STRIP: 1.03 (ref 1–1.03)
SQUAMOUS #/AREA URNS HPF: ABNORMAL /HPF
UROBILINOGEN UR QL STRIP: ABNORMAL
WBC # UR STRIP: ABNORMAL /HPF
WBC NRBC COR # BLD: 7.87 10*3/MM3 (ref 3.4–10.8)
WHOLE BLOOD HOLD COAG: NORMAL
WHOLE BLOOD HOLD SPECIMEN: NORMAL

## 2023-08-05 PROCEDURE — 83690 ASSAY OF LIPASE: CPT | Performed by: EMERGENCY MEDICINE

## 2023-08-05 PROCEDURE — 74177 CT ABD & PELVIS W/CONTRAST: CPT

## 2023-08-05 PROCEDURE — 25010000002 PIPERACILLIN SOD-TAZOBACTAM PER 1 G: Performed by: INTERNAL MEDICINE

## 2023-08-05 PROCEDURE — 87505 NFCT AGENT DETECTION GI: CPT | Performed by: EMERGENCY MEDICINE

## 2023-08-05 PROCEDURE — 87493 C DIFF AMPLIFIED PROBE: CPT | Performed by: EMERGENCY MEDICINE

## 2023-08-05 PROCEDURE — 81001 URINALYSIS AUTO W/SCOPE: CPT | Performed by: EMERGENCY MEDICINE

## 2023-08-05 PROCEDURE — 25510000001 IOPAMIDOL PER 1 ML: Performed by: EMERGENCY MEDICINE

## 2023-08-05 PROCEDURE — 99285 EMERGENCY DEPT VISIT HI MDM: CPT

## 2023-08-05 PROCEDURE — 25010000002 PIPERACILLIN SOD-TAZOBACTAM PER 1 G: Performed by: EMERGENCY MEDICINE

## 2023-08-05 PROCEDURE — 25010000002 KETOROLAC TROMETHAMINE PER 15 MG: Performed by: EMERGENCY MEDICINE

## 2023-08-05 PROCEDURE — 80053 COMPREHEN METABOLIC PANEL: CPT | Performed by: EMERGENCY MEDICINE

## 2023-08-05 PROCEDURE — 85025 COMPLETE CBC W/AUTO DIFF WBC: CPT | Performed by: EMERGENCY MEDICINE

## 2023-08-05 RX ORDER — SODIUM CHLORIDE 9 MG/ML
40 INJECTION, SOLUTION INTRAVENOUS AS NEEDED
Status: DISCONTINUED | OUTPATIENT
Start: 2023-08-05 | End: 2023-08-08 | Stop reason: HOSPADM

## 2023-08-05 RX ORDER — ONDANSETRON 2 MG/ML
4 INJECTION INTRAMUSCULAR; INTRAVENOUS EVERY 4 HOURS PRN
Status: DISCONTINUED | OUTPATIENT
Start: 2023-08-05 | End: 2023-08-08 | Stop reason: HOSPADM

## 2023-08-05 RX ORDER — SODIUM CHLORIDE 0.9 % (FLUSH) 0.9 %
10 SYRINGE (ML) INJECTION AS NEEDED
Status: DISCONTINUED | OUTPATIENT
Start: 2023-08-05 | End: 2023-08-08 | Stop reason: HOSPADM

## 2023-08-05 RX ORDER — KETOROLAC TROMETHAMINE 30 MG/ML
30 INJECTION, SOLUTION INTRAMUSCULAR; INTRAVENOUS ONCE
Status: COMPLETED | OUTPATIENT
Start: 2023-08-05 | End: 2023-08-05

## 2023-08-05 RX ORDER — MORPHINE SULFATE 2 MG/ML
2 INJECTION, SOLUTION INTRAMUSCULAR; INTRAVENOUS EVERY 4 HOURS PRN
Status: DISCONTINUED | OUTPATIENT
Start: 2023-08-05 | End: 2023-08-08 | Stop reason: HOSPADM

## 2023-08-05 RX ORDER — CHOLECALCIFEROL (VITAMIN D3) 125 MCG
5 CAPSULE ORAL NIGHTLY PRN
Status: DISCONTINUED | OUTPATIENT
Start: 2023-08-05 | End: 2023-08-08 | Stop reason: HOSPADM

## 2023-08-05 RX ORDER — CALCIUM CARBONATE 500 MG/1
2 TABLET, CHEWABLE ORAL 2 TIMES DAILY PRN
Status: DISCONTINUED | OUTPATIENT
Start: 2023-08-05 | End: 2023-08-08 | Stop reason: HOSPADM

## 2023-08-05 RX ORDER — SODIUM CHLORIDE 9 MG/ML
150 INJECTION, SOLUTION INTRAVENOUS CONTINUOUS
Status: DISCONTINUED | OUTPATIENT
Start: 2023-08-05 | End: 2023-08-06

## 2023-08-05 RX ORDER — ENOXAPARIN SODIUM 100 MG/ML
40 INJECTION SUBCUTANEOUS DAILY
Status: DISCONTINUED | OUTPATIENT
Start: 2023-08-05 | End: 2023-08-08 | Stop reason: HOSPADM

## 2023-08-05 RX ORDER — SODIUM CHLORIDE 0.9 % (FLUSH) 0.9 %
10 SYRINGE (ML) INJECTION EVERY 12 HOURS SCHEDULED
Status: DISCONTINUED | OUTPATIENT
Start: 2023-08-05 | End: 2023-08-08 | Stop reason: HOSPADM

## 2023-08-05 RX ORDER — ACETAMINOPHEN 325 MG/1
650 TABLET ORAL EVERY 4 HOURS PRN
Status: DISCONTINUED | OUTPATIENT
Start: 2023-08-05 | End: 2023-08-08 | Stop reason: HOSPADM

## 2023-08-05 RX ADMIN — PIPERACILLIN AND TAZOBACTAM 3.38 G: 3; .375 INJECTION, POWDER, LYOPHILIZED, FOR SOLUTION INTRAVENOUS at 17:54

## 2023-08-05 RX ADMIN — IOPAMIDOL 100 ML: 755 INJECTION, SOLUTION INTRAVENOUS at 08:51

## 2023-08-05 RX ADMIN — PIPERACILLIN AND TAZOBACTAM 3.38 G: 3; .375 INJECTION, POWDER, LYOPHILIZED, FOR SOLUTION INTRAVENOUS at 10:30

## 2023-08-05 RX ADMIN — SODIUM CHLORIDE 150 ML/HR: 9 INJECTION, SOLUTION INTRAVENOUS at 15:29

## 2023-08-05 RX ADMIN — SODIUM CHLORIDE 150 ML/HR: 9 INJECTION, SOLUTION INTRAVENOUS at 21:51

## 2023-08-05 RX ADMIN — SODIUM CHLORIDE 1000 ML: 9 INJECTION, SOLUTION INTRAVENOUS at 08:32

## 2023-08-05 RX ADMIN — KETOROLAC TROMETHAMINE 30 MG: 30 INJECTION, SOLUTION INTRAMUSCULAR; INTRAVENOUS at 08:30

## 2023-08-05 NOTE — LETTER
August 8, 2023     Patient: Jose Lyn   YOB: 1985   Date of Visit: 8/5/2023       To Whom It May Concern:    It is my medical opinion that Jose Lyn should remain out of work until ***.           Sincerely,        No name on file    CC:   No Recipients

## 2023-08-05 NOTE — PAYOR COMM NOTE
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 to me at this time. I have answered questions and addressed any concerns. The patient has a good  understanding of the patient's diagnosis, condition, and treatment plan as can be expected at this point. The vital signs have been stable. The patient's condition is stable and appropriate for discharge from the emergency department.      The patient will pursue further outpatient evaluation with the primary care physician or other designated or consulting physician as outlined in the discharge instructions. They are agreeable to this plan of care and follow-up instructions have been explained in detail. The patient has received these instructions in written format and have expressed an understanding of the discharge instructions. The patient is aware that any significant change in condition or worsening of symptoms should prompt an immediate return to this or the closest emergency department or call to 911.  I have explained discharge medications and the need for follow up with the patient/caretakers. This was also printed in the discharge  instructions. Patient was discharged with the following medications and follow up:      Medication List      No changes were made to your prescriptions during this visit.      No follow-up provider specified.     Final diagnoses:   Abdominal pain, acute, left lower quadrant   Diverticulitis of colon with perforation        ED Disposition       ED Disposition   Decision to Admit    Condition   --    Comment   --               This medical record created using voice recognition software.             Lazaro Rashid MD  08/05/23 1112      Electronically signed by Lazaro Rashid MD at 08/05/23 1112       Current Facility-Administered Medications   Medication Dose Route Frequency Provider Last Rate Last Admin    acetaminophen (TYLENOL) tablet 650 mg  650 mg Oral Q4H PRN Guille Durant MD        calcium carbonate (TUMS) chewable tablet 500 mg (200 mg elemental)  2 tablet Oral BID PRN Guille Durant MD        Enoxaparin Sodium (LOVENOX) syringe 40 mg  40 mg Subcutaneous Daily Guille Durant MD        melatonin tablet 5 mg  5 mg Oral Nightly PRN Guille Durant MD        morphine injection 2 mg  2 mg Intravenous Q4H PRN Guille Durant MD        morphine injection 4 mg  4 mg Intravenous Q4H PRN Guille Durant MD        ondansetron (ZOFRAN) injection 4 mg  4 mg Intravenous Q4H PRN Guille Durant MD        Pharmacy to Dose Zosyn   Does not apply Continuous PRN Guille Durant MD        piperacillin-tazobactam (ZOSYN) 3.375 g/100 mL 0.9% NS IVPB (mbp)  3.375 g Intravenous Q8H Guille Durant MD        sodium chloride 0.9 % flush 10 mL  10 mL Intravenous PRN Lazaro Rashid MD        sodium chloride 0.9 % flush 10 mL  10 mL Intravenous PRN Lazaro Rashid MD        sodium chloride 0.9 % flush 10 mL  10 mL Intravenous Q12H Guille Durant MD        sodium chloride 0.9 % flush 10 mL  10 mL Intravenous PRN Guille Durant MD        sodium chloride 0.9 % infusion 40 mL   40 mL Intravenous PRN Guille Durant MD        sodium chloride 0.9 % infusion  150 mL/hr Intravenous Continuous Guille Durant MD         Lab Results (last 24 hours)       Procedure Component Value Units Date/Time    [facility] Draw [[id number]] Collected: 08/05/23 0744    Specimen: Blood Updated: 08/05/23 0846    Narrative:      The following orders were created for panel order [facility] Draw.  Procedure                               Abnormality         Status                     ---------                               -----------         ------                     Green Top (Gel)[[id number]]                                  Final result               Lavender Top[[id number]]                                     Final result               Gold Top - SST[[id number]]                                   Final result               Light Blue Top[[id number]]                                   Final result                 Please view results for these tests on the individual orders.    Green Top (Gel) [[id number]] Collected: 08/05/23 0744    Specimen: Blood Updated: 08/05/23 0846     Extra Tube Hold for add-ons.     Comment: Auto resulted.       Lavender Top [[id number]] Collected: 08/05/23 0744    Specimen: Blood Updated: 08/05/23 0846     Extra Tube hold for add-on     Comment: Auto resulted       Gold Top - SST [[id number]] Collected: 08/05/23 0744    Specimen: Blood Updated: 08/05/23 0846     Extra Tube Hold for add-ons.     Comment: Auto resulted.       Light Blue Top [[id number]] Collected: 08/05/23 0744    Specimen: Blood Updated: 08/05/23 0846     Extra Tube Hold for add-ons.     Comment: Auto resulted       Urinalysis, Microscopic Only - Urine, Clean Catch [[id number]]  (Abnormal) Collected: 08/05/23 0830    Specimen: Urine, Clean Catch Updated: 08/05/23 0844     RBC, UA 0-2 /HPF      WBC, UA 0-2 /HPF      Bacteria, UA None Seen /HPF      Squamous Epithelial Cells, UA 0-2 /HPF      Hyaline Casts, UA 0-2 /LPF       Methodology Automated Microscopy    Urinalysis With Microscopic If Indicated (No Culture) - Urine, Clean Catch [[id number]]  (Abnormal) Collected: 08/05/23 0830    Specimen: Urine, Clean Catch Updated: 08/05/23 0842     Color, UA Dark Yellow     Appearance, UA Clear     pH, UA 5.5     Specific Gravity, UA 1.028     Glucose, UA Negative     Ketones, UA Trace     Bilirubin, UA Small (1+)     Blood, UA Negative     Protein, UA >=300 mg/dL (3+)     Leuk Esterase, UA Trace     Nitrite, UA Negative     Urobilinogen, UA 2.0 E.U./dL    Comprehensive Metabolic Panel [[id number]]  (Abnormal) Collected: 08/05/23 0744    Specimen: Blood Updated: 08/05/23 0808     Glucose 158 mg/dL      BUN 11 mg/dL      Creatinine 0.81 mg/dL      Sodium 137 mmol/L      Potassium 3.8 mmol/L      Chloride 99 mmol/L      CO2 25.1 mmol/L      Calcium 9.5 mg/dL      Total Protein 8.4 g/dL      Albumin 4.2 g/dL      ALT (SGPT) 54 U/L      AST (SGOT) 58 U/L      Alkaline Phosphatase 85 U/L      Total Bilirubin 0.3 mg/dL      Globulin 4.2 gm/dL      A/G Ratio 1.0 g/dL      BUN/Creatinine Ratio 13.6     Anion Gap 12.9 mmol/L      eGFR 115.7 mL/min/1.73     Narrative:      GFR Normal >60  Chronic Kidney Disease <60  Kidney Failure <15      Lipase [[id number]]  (Normal) Collected: 08/05/23 0744    Specimen: Blood Updated: 08/05/23 0808     Lipase 27 U/L     CBC & Differential [[id number]]  (Abnormal) Collected: 08/05/23 0744    Specimen: Blood Updated: 08/05/23 0750    Narrative:      The following orders were created for panel order CBC & Differential.  Procedure                               Abnormality         Status                     ---------                               -----------         ------                     CBC Auto Differential[[id number]]        Abnormal            Final result                 Please view results for these tests on the individual orders.    CBC Auto Differential [[id number]]  (Abnormal) Collected: 08/05/23 0744    Specimen:  Blood Updated: 08/05/23 0750     WBC 7.87 10*3/mm3      RBC 5.01 10*6/mm3      Hemoglobin 14.6 g/dL      Hematocrit 39.6 %      MCV 79.0 fL      MCH 29.1 pg      MCHC 36.9 g/dL      RDW 12.7 %      RDW-SD 36.0 fl      MPV 9.5 fL      Platelets 275 10*3/mm3      Neutrophil % 63.0 %      Lymphocyte % 17.0 %      Monocyte % 16.4 %      Eosinophil % 1.8 %      Basophil % 0.8 %      Immature Grans % 1.0 %      Neutrophils, Absolute 4.96 10*3/mm3      Lymphocytes, Absolute 1.34 10*3/mm3      Monocytes, Absolute 1.29 10*3/mm3      Eosinophils, Absolute 0.14 10*3/mm3      Basophils, Absolute 0.06 10*3/mm3      Immature Grans, Absolute 0.08 10*3/mm3      nRBC 0.0 /100 WBC           Imaging Results (Last 24 Hours)       Procedure Component Value Units Date/Time    CT Abdomen Pelvis With Contrast [[id number]] Collected: 08/05/23 0937     Updated: 08/05/23 0940    Narrative:      PROCEDURE: CT ABDOMEN PELVIS W CONTRAST     COMPARISON: [facility], CT, ABDOMEN/PELVIS WITH CONTRAST, 6/16/2017, 18:34.     INDICATIONS: eval LLQ & RLQ pain, acute diarrhea     TECHNIQUE: After obtaining the patient's consent, CT images were created with non-ionic intravenous   contrast material.       PROTOCOL:   Standard imaging protocol performed      RADIATION:   DLP: 514 mGy*cm    Automated exposure control was utilized to minimize radiation dose.   CONTRAST: 100 cc Isovue 370 I.V.     FINDINGS:   The lung bases are clear bilaterally.     Diffuse hepatic steatosis is noted.  No focal hepatic lesion is seen.  The gallbladder, spleen,   pancreas and adrenal glands appear unremarkable.  Both kidneys appear normal.       No adenopathy or free fluid is seen in the abdomen or pelvis.  Moderate inflammatory changes are   noted surrounding the proximal sigmoid colon.  There are small foci of gas in this region   consistent with a small perforation.  No abscess is seen peer the bowel otherwise appears   unremarkable.  The appendix appears  within normal limits.  The lack of oral contrast limits   evaluation of the bowel.     No focal osseous lesion is seen.       Impression:         1. Prominent inflammatory changes surrounding the proximal sigmoid colon favored to be secondary to   acute diverticulitis or colitis.  Underlying neoplasm is not definitively excluded.  2. Evidence of associated micro perforation with no abscess identified.  3. Diffuse hepatic steatosis            Sang Reddy M.D.         Electronically Signed and Approved By: Sang Reddy M.D. on 8/05/2023 at 9:37

## 2023-08-05 NOTE — LETTER
August 8, 2023     Patient: Jose Lyn   YOB: 1985   Date of Visit: 8/5/2023       To Whom It May Concern:    It is my medical opinion that Jose Lyn should be excused from work from 8/5/23 until 8/14/23. He can return to work on 8/15/23           Sincerely,    Radha Copeland RN     CC:   No Recipients
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
## 2023-08-06 LAB
ANION GAP SERPL CALCULATED.3IONS-SCNC: 12.7 MMOL/L (ref 5–15)
BASOPHILS # BLD AUTO: 0.05 10*3/MM3 (ref 0–0.2)
BASOPHILS NFR BLD AUTO: 0.8 % (ref 0–1.5)
BUN SERPL-MCNC: 11 MG/DL (ref 6–20)
BUN/CREAT SERPL: 13.9 (ref 7–25)
C COLI+JEJ+UPSA DNA STL QL NAA+NON-PROBE: NOT DETECTED
CALCIUM SPEC-SCNC: 8.6 MG/DL (ref 8.6–10.5)
CHLORIDE SERPL-SCNC: 106 MMOL/L (ref 98–107)
CO2 SERPL-SCNC: 21.3 MMOL/L (ref 22–29)
CREAT SERPL-MCNC: 0.79 MG/DL (ref 0.76–1.27)
DEPRECATED RDW RBC AUTO: 37.2 FL (ref 37–54)
EC STX1+STX2 GENES STL QL NAA+NON-PROBE: NOT DETECTED
EGFRCR SERPLBLD CKD-EPI 2021: 116.6 ML/MIN/1.73
EOSINOPHIL # BLD AUTO: 0.22 10*3/MM3 (ref 0–0.4)
EOSINOPHIL NFR BLD AUTO: 3.5 % (ref 0.3–6.2)
ERYTHROCYTE [DISTWIDTH] IN BLOOD BY AUTOMATED COUNT: 12.7 % (ref 12.3–15.4)
GLUCOSE SERPL-MCNC: 94 MG/DL (ref 65–99)
HCT VFR BLD AUTO: 37.9 % (ref 37.5–51)
HGB BLD-MCNC: 13.4 G/DL (ref 13–17.7)
IMM GRANULOCYTES # BLD AUTO: 0.06 10*3/MM3 (ref 0–0.05)
IMM GRANULOCYTES NFR BLD AUTO: 1 % (ref 0–0.5)
LYMPHOCYTES # BLD AUTO: 1.46 10*3/MM3 (ref 0.7–3.1)
LYMPHOCYTES NFR BLD AUTO: 23.2 % (ref 19.6–45.3)
MAGNESIUM SERPL-MCNC: 2.1 MG/DL (ref 1.6–2.6)
MCH RBC QN AUTO: 28.5 PG (ref 26.6–33)
MCHC RBC AUTO-ENTMCNC: 35.4 G/DL (ref 31.5–35.7)
MCV RBC AUTO: 80.6 FL (ref 79–97)
MONOCYTES # BLD AUTO: 1.02 10*3/MM3 (ref 0.1–0.9)
MONOCYTES NFR BLD AUTO: 16.2 % (ref 5–12)
NEUTROPHILS NFR BLD AUTO: 3.49 10*3/MM3 (ref 1.7–7)
NEUTROPHILS NFR BLD AUTO: 55.3 % (ref 42.7–76)
NRBC BLD AUTO-RTO: 0 /100 WBC (ref 0–0.2)
PLATELET # BLD AUTO: 284 10*3/MM3 (ref 140–450)
PMV BLD AUTO: 10 FL (ref 6–12)
POTASSIUM SERPL-SCNC: 3.8 MMOL/L (ref 3.5–5.2)
RBC # BLD AUTO: 4.7 10*6/MM3 (ref 4.14–5.8)
S ENT+BONG DNA STL QL NAA+NON-PROBE: NOT DETECTED
SHIGELLA SP+EIEC IPAH ST NAA+NON-PROBE: NOT DETECTED
SODIUM SERPL-SCNC: 140 MMOL/L (ref 136–145)
WBC NRBC COR # BLD: 6.3 10*3/MM3 (ref 3.4–10.8)

## 2023-08-06 PROCEDURE — 25010000002 ENOXAPARIN PER 10 MG: Performed by: INTERNAL MEDICINE

## 2023-08-06 PROCEDURE — 25010000002 PIPERACILLIN SOD-TAZOBACTAM PER 1 G: Performed by: INTERNAL MEDICINE

## 2023-08-06 PROCEDURE — 85025 COMPLETE CBC W/AUTO DIFF WBC: CPT | Performed by: INTERNAL MEDICINE

## 2023-08-06 PROCEDURE — 83735 ASSAY OF MAGNESIUM: CPT | Performed by: INTERNAL MEDICINE

## 2023-08-06 PROCEDURE — 36415 COLL VENOUS BLD VENIPUNCTURE: CPT | Performed by: INTERNAL MEDICINE

## 2023-08-06 PROCEDURE — 80048 BASIC METABOLIC PNL TOTAL CA: CPT | Performed by: INTERNAL MEDICINE

## 2023-08-06 PROCEDURE — 99222 1ST HOSP IP/OBS MODERATE 55: CPT | Performed by: SURGERY

## 2023-08-06 RX ORDER — HYDROCODONE BITARTRATE AND ACETAMINOPHEN 5; 325 MG/1; MG/1
1 TABLET ORAL EVERY 4 HOURS PRN
Status: DISCONTINUED | OUTPATIENT
Start: 2023-08-06 | End: 2023-08-08 | Stop reason: HOSPADM

## 2023-08-06 RX ORDER — SODIUM CHLORIDE, SODIUM LACTATE, POTASSIUM CHLORIDE, CALCIUM CHLORIDE 600; 310; 30; 20 MG/100ML; MG/100ML; MG/100ML; MG/100ML
75 INJECTION, SOLUTION INTRAVENOUS CONTINUOUS
Status: DISCONTINUED | OUTPATIENT
Start: 2023-08-06 | End: 2023-08-08

## 2023-08-06 RX ORDER — HYDROCODONE BITARTRATE AND ACETAMINOPHEN 7.5; 325 MG/1; MG/1
1 TABLET ORAL EVERY 4 HOURS PRN
Status: DISCONTINUED | OUTPATIENT
Start: 2023-08-06 | End: 2023-08-08 | Stop reason: HOSPADM

## 2023-08-06 RX ADMIN — SODIUM CHLORIDE, POTASSIUM CHLORIDE, SODIUM LACTATE AND CALCIUM CHLORIDE 75 ML/HR: 600; 310; 30; 20 INJECTION, SOLUTION INTRAVENOUS at 11:56

## 2023-08-06 RX ADMIN — SODIUM CHLORIDE, POTASSIUM CHLORIDE, SODIUM LACTATE AND CALCIUM CHLORIDE 75 ML/HR: 600; 310; 30; 20 INJECTION, SOLUTION INTRAVENOUS at 22:01

## 2023-08-06 RX ADMIN — SODIUM CHLORIDE 150 ML/HR: 9 INJECTION, SOLUTION INTRAVENOUS at 04:13

## 2023-08-06 RX ADMIN — ENOXAPARIN SODIUM 40 MG: 100 INJECTION SUBCUTANEOUS at 09:20

## 2023-08-06 RX ADMIN — PIPERACILLIN AND TAZOBACTAM 3.38 G: 3; .375 INJECTION, POWDER, LYOPHILIZED, FOR SOLUTION INTRAVENOUS at 01:06

## 2023-08-06 RX ADMIN — PIPERACILLIN AND TAZOBACTAM 3.38 G: 3; .375 INJECTION, POWDER, LYOPHILIZED, FOR SOLUTION INTRAVENOUS at 18:35

## 2023-08-06 RX ADMIN — PIPERACILLIN AND TAZOBACTAM 3.38 G: 3; .375 INJECTION, POWDER, LYOPHILIZED, FOR SOLUTION INTRAVENOUS at 09:20

## 2023-08-06 NOTE — PLAN OF CARE
Goal Outcome Evaluation:  Plan of Care Reviewed With: patient        Progress: no change  Outcome Evaluation: patient had no issues or complaints during the shift. states abdominal pain is 4/10. cdiff results negative. IV fluids and IV abx given as ordered.

## 2023-08-06 NOTE — PROGRESS NOTES
[facility]   Hospitalist Progress Note  Date: 2023  Patient Name: Jose Lyn  : 1985  MRN: [id number]  Date of admission: 2023      Subjective   Subjective     Chief Complaint: Abdominal pain    Summary: 37 y.o. male no significant past medical history who presented to the ED on  due to complaints of 1 week of abdominal pain and diarrhea.   Imaging in the ED revealed sigmoid diverticulitis with microperforation.  He was admitted for further care, general surgery was consulted, initially kept n.p.o., started on Zosyn and IV fluids.  Pain control with IV narcotics.    Interval Followup: No acute events overnight.  He has been tolerating ice chips abdomen has had an appetite and wants to eat.  However, he understands the importance of n.p.o. status.  Pain is about a 4/10, was about a 7/10 when he first came in.  No nausea or vomiting.    Objective   Objective     Vitals:   Temp:  [97.8 øF (36.6 øC)-98.5 øF (36.9 øC)] 98.2 øF (36.8 øC)  Heart Rate:  [48-59] 51  Resp:  [16] 16  BP: (115-132)/(72-88) 132/88  Physical Exam    Constitutional: Awake, alert, no acute distress, appears fatigued   Respiratory: Clear to auscultation bilaterally, nonlabored respirations    Cardiovascular: RRR, no MRG   Gastrointestinal: Positive bowel sounds, soft, TTP in left lower quadrant, nondistended   Neurologic: Oriented x 3, strength symmetric in all extremities, Cranial Nerves grossly intact to confrontation, speech clear    Result Review    Result Review:  I have personally reviewed the results below:  [x]  Laboratory personally reviewed CBC, BMP, magnesium, phosphorus  []  Microbiology  []  Radiology  []  EKG/Telemetry   []  Cardiology/Vascular   []  Pathology  []  Old records  []  Other:  CBC          2023    07:39 2023    07:44 2023    04:12   CBC   WBC 4.70  7.87  6.30    RBC 5.75  5.01  4.70    Hemoglobin 17.1  14.6  13.4    Hematocrit 45.8  39.6  37.9    MCV 79.7  79.0  80.6    MCH 29.7   29.1  28.5    MCHC 37.3  36.9  35.4    RDW 13.0  12.7  12.7    Platelets 184  275  284      CMP          6/7/2023    07:39 8/5/2023    07:44 8/6/2023    04:12   CMP   Glucose 114  158  94    BUN 7  11  11    Creatinine 0.77  0.81  0.79    EGFR 117.5  115.7  116.6    Sodium 134  137  140    Potassium 4.2  3.8  3.8    Chloride 100  99  106    Calcium 9.3  9.5  8.6    Total Protein 7.8  8.4     Albumin 4.6  4.2     Globulin 3.2  4.2     Total Bilirubin 0.2  0.3     Alkaline Phosphatase 75  85     AST (SGOT) 59  58     ALT (SGPT) 85  54     Albumin/Globulin Ratio 1.4  1.0     BUN/Creatinine Ratio 9.1  13.6  13.9    Anion Gap 12.9  12.9  12.7        Assessment & Plan   Assessment / Plan     Assessment/Plan:  Acute infectious severe proximal sigmoid diverticulitis with associated microperforation   Intractable abdominal pain requiring IV narcotics  Tobacco dependence  Metabolic acidosis  Alcohol use, no history of withdrawal and has not drank since Wednesday  Family history of colon cancer, follows with Dr. Mcclelland and had a colonoscopy this year     Continue to monitor in the hospital for management of the above  General surgery following, appreciate assistance  Keep n.p.o. for now, will allow ice chips and sips with meds, advance per general surgery recommendations  Continue IV morphine 2 mg as needed for moderate pain, 4 mg as needed for severe pain, monitor vital signs closely while on IV narcotics  Add oral Norco as needed for pain  Continue IV Zosyn, will likely require 10-14 days of antibiotics  Notable metabolic acidosis, will change NS to LR at 75 cc/h  Patient is still severely ill with severe diverticulitis with microperforation necessitating further inpatient management  Trend renal function and electrolytes with a.m. BMP, magnesium   Trend Hgb and WBC with a.m. CBC     Discussed case with: Bedside RN, general surgery    DVT prophylaxis:  Medical and mechanical DVT prophylaxis orders are present.    CODE STATUS:    Level Of Support Discussed With: Patient  Code Status (Patient has no pulse and is not breathing): CPR (Attempt to Resuscitate)  Medical Interventions (Patient has pulse or is breathing): Full Support  Release to patient: Routine Release      Electronically signed by Guille Villanueva MD, 08/06/23, 11:43 AM EDT.
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
## 2023-08-07 ENCOUNTER — TELEPHONE (OUTPATIENT)
Dept: INTERNAL MEDICINE | Facility: CLINIC | Age: 38
End: 2023-08-07
Payer: COMMERCIAL

## 2023-08-07 LAB
ANION GAP SERPL CALCULATED.3IONS-SCNC: 13.7 MMOL/L (ref 5–15)
BASOPHILS # BLD AUTO: 0.06 10*3/MM3 (ref 0–0.2)
BASOPHILS NFR BLD AUTO: 0.9 % (ref 0–1.5)
BUN SERPL-MCNC: 8 MG/DL (ref 6–20)
BUN/CREAT SERPL: 11.4 (ref 7–25)
CALCIUM SPEC-SCNC: 9.1 MG/DL (ref 8.6–10.5)
CHLORIDE SERPL-SCNC: 102 MMOL/L (ref 98–107)
CO2 SERPL-SCNC: 22.3 MMOL/L (ref 22–29)
CREAT SERPL-MCNC: 0.7 MG/DL (ref 0.76–1.27)
DEPRECATED RDW RBC AUTO: 36.4 FL (ref 37–54)
EGFRCR SERPLBLD CKD-EPI 2021: 121 ML/MIN/1.73
EOSINOPHIL # BLD AUTO: 0.16 10*3/MM3 (ref 0–0.4)
EOSINOPHIL NFR BLD AUTO: 2.4 % (ref 0.3–6.2)
ERYTHROCYTE [DISTWIDTH] IN BLOOD BY AUTOMATED COUNT: 12.8 % (ref 12.3–15.4)
GLUCOSE SERPL-MCNC: 86 MG/DL (ref 65–99)
HCT VFR BLD AUTO: 40.5 % (ref 37.5–51)
HGB BLD-MCNC: 14.5 G/DL (ref 13–17.7)
IMM GRANULOCYTES # BLD AUTO: 0.05 10*3/MM3 (ref 0–0.05)
IMM GRANULOCYTES NFR BLD AUTO: 0.7 % (ref 0–0.5)
LYMPHOCYTES # BLD AUTO: 1.55 10*3/MM3 (ref 0.7–3.1)
LYMPHOCYTES NFR BLD AUTO: 23 % (ref 19.6–45.3)
MAGNESIUM SERPL-MCNC: 1.9 MG/DL (ref 1.6–2.6)
MCH RBC QN AUTO: 28.7 PG (ref 26.6–33)
MCHC RBC AUTO-ENTMCNC: 35.8 G/DL (ref 31.5–35.7)
MCV RBC AUTO: 80 FL (ref 79–97)
MONOCYTES # BLD AUTO: 0.86 10*3/MM3 (ref 0.1–0.9)
MONOCYTES NFR BLD AUTO: 12.8 % (ref 5–12)
NEUTROPHILS NFR BLD AUTO: 4.06 10*3/MM3 (ref 1.7–7)
NEUTROPHILS NFR BLD AUTO: 60.2 % (ref 42.7–76)
NRBC BLD AUTO-RTO: 0 /100 WBC (ref 0–0.2)
PHOSPHATE SERPL-MCNC: 3.3 MG/DL (ref 2.5–4.5)
PLATELET # BLD AUTO: 309 10*3/MM3 (ref 140–450)
PMV BLD AUTO: 9.9 FL (ref 6–12)
POTASSIUM SERPL-SCNC: 3.8 MMOL/L (ref 3.5–5.2)
RBC # BLD AUTO: 5.06 10*6/MM3 (ref 4.14–5.8)
SODIUM SERPL-SCNC: 138 MMOL/L (ref 136–145)
WBC NRBC COR # BLD: 6.74 10*3/MM3 (ref 3.4–10.8)

## 2023-08-07 PROCEDURE — 85025 COMPLETE CBC W/AUTO DIFF WBC: CPT | Performed by: INTERNAL MEDICINE

## 2023-08-07 PROCEDURE — 25010000002 PIPERACILLIN SOD-TAZOBACTAM PER 1 G: Performed by: INTERNAL MEDICINE

## 2023-08-07 PROCEDURE — 25010000002 ENOXAPARIN PER 10 MG: Performed by: INTERNAL MEDICINE

## 2023-08-07 PROCEDURE — 84100 ASSAY OF PHOSPHORUS: CPT | Performed by: INTERNAL MEDICINE

## 2023-08-07 PROCEDURE — 99231 SBSQ HOSP IP/OBS SF/LOW 25: CPT | Performed by: NURSE PRACTITIONER

## 2023-08-07 PROCEDURE — 83735 ASSAY OF MAGNESIUM: CPT | Performed by: INTERNAL MEDICINE

## 2023-08-07 PROCEDURE — 80048 BASIC METABOLIC PNL TOTAL CA: CPT | Performed by: INTERNAL MEDICINE

## 2023-08-07 RX ADMIN — SODIUM CHLORIDE, POTASSIUM CHLORIDE, SODIUM LACTATE AND CALCIUM CHLORIDE 75 ML/HR: 600; 310; 30; 20 INJECTION, SOLUTION INTRAVENOUS at 21:12

## 2023-08-07 RX ADMIN — ENOXAPARIN SODIUM 40 MG: 100 INJECTION SUBCUTANEOUS at 08:36

## 2023-08-07 RX ADMIN — PIPERACILLIN AND TAZOBACTAM 3.38 G: 3; .375 INJECTION, POWDER, LYOPHILIZED, FOR SOLUTION INTRAVENOUS at 01:06

## 2023-08-07 RX ADMIN — PIPERACILLIN AND TAZOBACTAM 3.38 G: 3; .375 INJECTION, POWDER, LYOPHILIZED, FOR SOLUTION INTRAVENOUS at 08:36

## 2023-08-07 RX ADMIN — Medication 10 ML: at 08:36

## 2023-08-07 RX ADMIN — PIPERACILLIN AND TAZOBACTAM 3.38 G: 3; .375 INJECTION, POWDER, LYOPHILIZED, FOR SOLUTION INTRAVENOUS at 16:48

## 2023-08-07 NOTE — PROGRESS NOTES
"PROGRESS NOTE     Patient Name:  Jose Lyn  YOB: 1985  [id number]   LOS: 2 days   * No surgery found *  Patient Care Team:  Tyron Jason MD as PCP - General (Internal Medicine)  Anita Martinez APRN as Nurse Practitioner (Nurse Practitioner)      Reason for Consult/Chief complaint:    abd pain    Subjective     Interval History: VSS, afebrile, WBC 6, pain controlled      Review of Systems:      A complete review of systems was performed and all are negative except what is documented in the HPI.       Objective         Physical Exam:     Constitutional:  well nourished, no acute distress, appear stated age /80   Pulse 52   Temp 98 øF (36.7 øC)   Resp 16   Ht 185.4 cm (73\")   Wt 95.5 kg (210 lb 8.6 oz)   SpO2 98%   BMI 27.78 kg/mý    Eyes:  anicteric sclerae, moist conjunctivae, no lid lag, PERRLA  ENMT:  oropharynx clear, moist mucous membranes, good dentition  Neck:   full ROM, trachea midline, no thyromegaly  Cardiovascular: RRR, S1 and S2 present, no MRG, heart rate 52, no pedal edema  Respiratory: lungs CTA, respirations even and unlabored  GI:  Abdomen soft, minimal tender, nondistended, no HSM     Skin:  warm and dry, normal turgor, no rashes  Psychiatric:  alert and oriented x3, intact judgment and insight, cooperative    Results Review:      I reviewed the patient's new clinical results including CBC, BMP.  LABS:  WBC   Date Value Ref Range Status   08/07/2023 6.74 3.40 - 10.80 10*3/mm3 Final     RBC   Date Value Ref Range Status   08/07/2023 5.06 4.14 - 5.80 10*6/mm3 Final     Hemoglobin   Date Value Ref Range Status   08/07/2023 14.5 13.0 - 17.7 g/dL Final     Hematocrit   Date Value Ref Range Status   08/07/2023 40.5 37.5 - 51.0 % Final     MCV   Date Value Ref Range Status   08/07/2023 80.0 79.0 - 97.0 fL Final     MCH   Date Value Ref Range Status   08/07/2023 28.7 26.6 - 33.0 pg Final     MCHC   Date Value Ref Range Status   08/07/2023 35.8 (H) 31.5 - 35.7 " g/dL Final     RDW   Date Value Ref Range Status   08/07/2023 12.8 12.3 - 15.4 % Final     RDW-SD   Date Value Ref Range Status   08/07/2023 36.4 (L) 37.0 - 54.0 fl Final     MPV   Date Value Ref Range Status   08/07/2023 9.9 6.0 - 12.0 fL Final     Platelets   Date Value Ref Range Status   08/07/2023 309 140 - 450 10*3/mm3 Final     Neutrophil %   Date Value Ref Range Status   08/07/2023 60.2 42.7 - 76.0 % Final     Lymphocyte %   Date Value Ref Range Status   08/07/2023 23.0 19.6 - 45.3 % Final     Monocyte %   Date Value Ref Range Status   08/07/2023 12.8 (H) 5.0 - 12.0 % Final     Eosinophil %   Date Value Ref Range Status   08/07/2023 2.4 0.3 - 6.2 % Final     Basophil %   Date Value Ref Range Status   08/07/2023 0.9 0.0 - 1.5 % Final     Immature Grans %   Date Value Ref Range Status   08/07/2023 0.7 (H) 0.0 - 0.5 % Final     Neutrophils, Absolute   Date Value Ref Range Status   08/07/2023 4.06 1.70 - 7.00 10*3/mm3 Final     Lymphocytes, Absolute   Date Value Ref Range Status   08/07/2023 1.55 0.70 - 3.10 10*3/mm3 Final     Monocytes, Absolute   Date Value Ref Range Status   08/07/2023 0.86 0.10 - 0.90 10*3/mm3 Final     Eosinophils, Absolute   Date Value Ref Range Status   08/07/2023 0.16 0.00 - 0.40 10*3/mm3 Final     Basophils, Absolute   Date Value Ref Range Status   08/07/2023 0.06 0.00 - 0.20 10*3/mm3 Final     Immature Grans, Absolute   Date Value Ref Range Status   08/07/2023 0.05 0.00 - 0.05 10*3/mm3 Final     nRBC   Date Value Ref Range Status   08/07/2023 0.0 0.0 - 0.2 /100 WBC Final         Basic Metabolic Panel    Sodium Sodium   Date Value Ref Range Status   08/07/2023 138 136 - 145 mmol/L Final   08/06/2023 140 136 - 145 mmol/L Final   08/05/2023 137 136 - 145 mmol/L Final      Potassium Potassium   Date Value Ref Range Status   08/07/2023 3.8 3.5 - 5.2 mmol/L Final   08/06/2023 3.8 3.5 - 5.2 mmol/L Final   08/05/2023 3.8 3.5 - 5.2 mmol/L Final      Chloride Chloride   Date Value Ref Range  Status   08/07/2023 102 98 - 107 mmol/L Final   08/06/2023 106 98 - 107 mmol/L Final   08/05/2023 99 98 - 107 mmol/L Final      Bicarbonate No results found for: PLASMABICARB   BUN BUN   Date Value Ref Range Status   08/07/2023 8 6 - 20 mg/dL Final   08/06/2023 11 6 - 20 mg/dL Final   08/05/2023 11 6 - 20 mg/dL Final      Creatinine Creatinine   Date Value Ref Range Status   08/07/2023 0.70 (L) 0.76 - 1.27 mg/dL Final   08/06/2023 0.79 0.76 - 1.27 mg/dL Final   08/05/2023 0.81 0.76 - 1.27 mg/dL Final      Calcium Calcium   Date Value Ref Range Status   08/07/2023 9.1 8.6 - 10.5 mg/dL Final   08/06/2023 8.6 8.6 - 10.5 mg/dL Final   08/05/2023 9.5 8.6 - 10.5 mg/dL Final      Glucose      No components found for: GLUCOSE.*         IMAGING:  Imaging Results (Last 72 Hours)       Procedure Component Value Units Date/Time    CT Abdomen Pelvis With Contrast [[id number]] Collected: 08/05/23 0937     Updated: 08/05/23 0940    Narrative:      PROCEDURE: CT ABDOMEN PELVIS W CONTRAST     COMPARISON: [facility], CT, ABDOMEN/PELVIS WITH CONTRAST, 6/16/2017, 18:34.     INDICATIONS: eval LLQ & RLQ pain, acute diarrhea     TECHNIQUE: After obtaining the patient's consent, CT images were created with non-ionic intravenous   contrast material.       PROTOCOL:   Standard imaging protocol performed      RADIATION:   DLP: 514 mGy*cm    Automated exposure control was utilized to minimize radiation dose.   CONTRAST: 100 cc Isovue 370 I.V.     FINDINGS:   The lung bases are clear bilaterally.     Diffuse hepatic steatosis is noted.  No focal hepatic lesion is seen.  The gallbladder, spleen,   pancreas and adrenal glands appear unremarkable.  Both kidneys appear normal.       No adenopathy or free fluid is seen in the abdomen or pelvis.  Moderate inflammatory changes are   noted surrounding the proximal sigmoid colon.  There are small foci of gas in this region   consistent with a small perforation.  No abscess is seen peer the  bowel otherwise appears   unremarkable.  The appendix appears within normal limits.  The lack of oral contrast limits   evaluation of the bowel.     No focal osseous lesion is seen.       Impression:         1. Prominent inflammatory changes surrounding the proximal sigmoid colon favored to be secondary to   acute diverticulitis or colitis.  Underlying neoplasm is not definitively excluded.  2. Evidence of associated micro perforation with no abscess identified.  3. Diffuse hepatic steatosis            Sang Reddy M.D.         Electronically Signed and Approved By: Sang Reddy M.D. on 8/05/2023 at 9:37                             Medications:    Current Facility-Administered Medications:     acetaminophen (TYLENOL) tablet 650 mg, 650 mg, Oral, Q4H PRN, Guille Durant MD    calcium carbonate (TUMS) chewable tablet 500 mg (200 mg elemental), 2 tablet, Oral, BID PRN, Guille Durant MD    Enoxaparin Sodium (LOVENOX) syringe 40 mg, 40 mg, Subcutaneous, Daily, Guille Durant MD, 40 mg at 08/07/23 0836    HYDROcodone-acetaminophen (NORCO) 5-325 MG per tablet 1 tablet, 1 tablet, Oral, Q4H PRN, Guille Durant MD    HYDROcodone-acetaminophen (NORCO) 7.5-325 MG per tablet 1 tablet, 1 tablet, Oral, Q4H PRN, Guille Durant MD    lactated ringers infusion, 75 mL/hr, Intravenous, Continuous, Guille Durant MD, Last Rate: 75 mL/hr at 08/06/23 2201, 75 mL/hr at 08/06/23 2201    melatonin tablet 5 mg, 5 mg, Oral, Nightly PRN, Guille Durant MD    morphine injection 2 mg, 2 mg, Intravenous, Q4H PRN, Guille Durant MD    morphine injection 4 mg, 4 mg, Intravenous, Q4H PRN, Guille Durant MD    ondansetron (ZOFRAN) injection 4 mg, 4 mg, Intravenous, Q4H PRN, Guille Durant MD    Pharmacy to Dose Zosyn, , Does not apply, Continuous PRN, Guille Durant MD    piperacillin-tazobactam (ZOSYN) 3.375 g/100 mL 0.9% NS IVPB (mbp), 3.375 g, Intravenous, Q8H, Guille Durant MD, 3.375  g at 08/07/23 0836    sodium chloride 0.9 % flush 10 mL, 10 mL, Intravenous, PRN, Lazaro Rashid MD    [COMPLETED] Insert Peripheral IV, , , Once **AND** sodium chloride 0.9 % flush 10 mL, 10 mL, Intravenous, PRN, Lazaro Rashid MD    sodium chloride 0.9 % flush 10 mL, 10 mL, Intravenous, Q12H, Guille Durant MD, 10 mL at 08/07/23 0836    sodium chloride 0.9 % flush 10 mL, 10 mL, Intravenous, PRN, Guille Durant MD    sodium chloride 0.9 % infusion 40 mL, 40 mL, Intravenous, Carleen BOATENG Nicholas, MD    Assessment & Plan       Diverticulitis of colon with perforation   Cont IV antibiotics   Low residue diet   CBC and BMP in AM      VIANEY Zacarias  08/07/23  09:48 EDT    Electronically signed by VIANEY Zacarias, 08/07/23, 9:48 AM EDT.

## 2023-08-07 NOTE — PLAN OF CARE
Goal Outcome Evaluation:      No significant complaints of pain this shift. Advanced to low residue diet for supper. No complaints of nausea. VSS

## 2023-08-07 NOTE — TELEPHONE ENCOUNTER
Caller: Jim Lyn    Relationship: Emergency Contact    Best call back number: [phone]    What is the best time to reach you: ANY TIME    Who are you requesting to speak with (clinical staff, provider,  specific staff member): EUGENE HENSLEY        What was the call regarding: PATIENT IS CURRENTLY IN THE HOSPITAL AND SPOUSE NEEDS TO DISCUSS FMLA     Is it okay if the provider responds through MyChart: NO

## 2023-08-07 NOTE — CONSULTS
"Nutrition Services    Patient Name: Jose Lyn  YOB: 1985  MRN: [id number]  Admission date: 8/5/2023      CLINICAL NUTRITION ASSESSMENT      Reason for Assessment  Identified at risk by screening criteria, MST score 2+, Physician consult     H&P:    Past Medical History:   Diagnosis Date    Gastritis     Mixed hyperlipidemia     Vitamin D deficiency, unspecified         Current Problems:   Active Hospital Problems    Diagnosis     **Diverticulitis of colon with perforation         Nutrition/Diet History         Narrative     RD assessed pt 2/2 consult per admission screen and MST of 3, w/ reported wt loss (14-23) and decreased intake x 2 months. Pt is admitted d/t diverticulitis w/ colon perforation.     Pt is NPO for bowel rest per Surgeon. Chart review shows insignificant wt loss x 6 months. Per MD note, pt has an appetite and is asking to eat.     RD will f/u once diet advances to provide nutrition intervention as needed.      Anthropometrics        Current Height, Weight Height: 185.4 cm (73\")  Weight: 95.5 kg (210 lb 8.6 oz)   Current BMI Body mass index is 27.78 kg/mý.       Weight Hx  Wt Readings from Last 30 Encounters:   08/05/23 0738 95.5 kg (210 lb 8.6 oz)   06/15/23 1653 98.8 kg (217 lb 12.8 oz)   06/07/23 0653 97.6 kg (215 lb 2.7 oz)   02/28/23 0835 104 kg (228 lb 9.6 oz)   02/21/23 1220 102 kg (223 lb 15.8 oz)   02/17/23 1527 99.8 kg (220 lb)   02/01/23 1518 104 kg (230 lb 3.2 oz)   12/28/22 0905 105 kg (231 lb)   11/01/22 0932 103 kg (226 lb 12.8 oz)   10/14/22 1543 102 kg (223 lb 15.8 oz)   11/24/21 1238 105 kg (230 lb 6.4 oz)   07/23/21 1258 100 kg (221 lb 3.2 oz)   07/19/21 1249 95.3 kg (210 lb)   11/15/19 0000 95.3 kg (210 lb)   10/30/19 0000 95.9 kg (211 lb 6 oz)            Wt Change Observation -5.8% x 6 mo--not clinically significant      Estimated/Assessed Needs       Energy Requirements 25-30 kcal/kg adj BW (84 kg)   EST Needs (kcal/day) 6278-1748       Protein " Requirements 1.0-1.2 g/kg   EST Daily Needs (g/day)        Fluid Requirements 25 ml/kg    Estimated Needs (mL/day) 2100     Labs/Medications         Pertinent Labs Reviewed.   Results from last 7 days   Lab Units 08/07/23 0439 08/06/23 0412 08/05/23  0744   SODIUM mmol/L 138 140 137   POTASSIUM mmol/L 3.8 3.8 3.8   CHLORIDE mmol/L 102 106 99   CO2 mmol/L 22.3 21.3* 25.1   BUN mg/dL 8 11 11   CREATININE mg/dL 0.70* 0.79 0.81   CALCIUM mg/dL 9.1 8.6 9.5   BILIRUBIN mg/dL  --   --  0.3   ALK PHOS U/L  --   --  85   ALT (SGPT) U/L  --   --  54*   AST (SGOT) U/L  --   --  58*   GLUCOSE mg/dL 86 94 158*     Results from last 7 days   Lab Units 08/07/23 0439 08/06/23  0412   MAGNESIUM mg/dL 1.9 2.1   PHOSPHORUS mg/dL 3.3  --    HEMOGLOBIN g/dL 14.5 13.4   HEMATOCRIT % 40.5 37.9     No results found for: COVID19  No results found for: HGBA1C      Pertinent Medications Reviewed.     Current Nutrition Orders & Evaluation of Intake       Oral Nutrition     Current PO Diet NPO Diet NPO Type: Sips with Meds, Ice Chips   Supplement No active supplement orders       Malnutrition Severity Assessment                Nutrition Diagnosis         Nutrition Dx Problem 1 Inadequate energy Intake related to Inability to consume sufficient energy as evidenced by NPO       Nutrition Intervention         NPO for bowel rest     RD to monitor intake adequacy once diet advances.  Pt may benefit from diverticulosis/itis diet education      Medical Nutrition Therapy/Nutrition Education          Learner     Readiness Patient  Education not appropriate at this time     Method     Response N/A  N/A     Monitor/Evaluation        Monitor Per protocol, GI status, POC/GOC, Diet advancement       Nutrition Discharge Plan         To be determined       Electronically signed by:  Yisel Frazier RD  08/07/23 10:14 EDT

## 2023-08-07 NOTE — PROGRESS NOTES
[facility]   Hospitalist Progress Note  Date: 2023  Patient Name: Jose Lyn  : 1985  MRN: [id number]  Date of admission: 2023      Subjective   Subjective     Chief Complaint: Abdominal pain    Summary: 37 y.o. male no significant past medical history who presented to the ED on  due to complaints of 1 week of abdominal pain and diarrhea.   Imaging in the ED revealed sigmoid diverticulitis with microperforation.  He was admitted for further care, general surgery was consulted, initially kept n.p.o., started on Zosyn and IV fluids.  Pain control with IV narcotics.    Interval Followup: No acute events overnight.  States pain continues to improve daily, the pain in his groin is most completely resolved and his pain is now in the lower abdomen.  About a 4/10.  He is not using any of the pain medications as he states it has been tolerable.  Had diarrhea overnight, sent for C. difficile and stool culture which were negative.    Objective   Objective     Vitals:   Temp:  [97.8 øF (36.6 øC)-98.5 øF (36.9 øC)] 97.8 øF (36.6 øC)  Heart Rate:  [49-72] 49  Resp:  [16] 16  BP: (124-148)/(75-91) 135/87  Physical Exam    Constitutional: Awake, alert, no acute distress   Respiratory: Clear to auscultation bilaterally, nonlabored respirations    Cardiovascular: RRR, no MRG   Gastrointestinal: Positive bowel sounds, soft, mild tenderness left lower quadrant, nondistended   Neurologic: Oriented x 3, strength symmetric in all extremities, Cranial Nerves grossly intact to confrontation, speech clear    Result Review    Result Review:  I have personally reviewed the results below:  [x]  Laboratory personally reviewed CBC, BMP, magnesium, phosphorus  []  Microbiology  []  Radiology  []  EKG/Telemetry   []  Cardiology/Vascular   []  Pathology  []  Old records  []  Other:  CBC          2023    07:44 2023    04:12 2023    04:39   CBC   WBC 7.87  6.30  6.74    RBC 5.01  4.70  5.06    Hemoglobin 14.6   13.4  14.5    Hematocrit 39.6  37.9  40.5    MCV 79.0  80.6  80.0    MCH 29.1  28.5  28.7    MCHC 36.9  35.4  35.8    RDW 12.7  12.7  12.8    Platelets 275  284  309      CMP          8/5/2023    07:44 8/6/2023    04:12 8/7/2023    04:39   CMP   Glucose 158  94  86    BUN 11  11  8    Creatinine 0.81  0.79  0.70    EGFR 115.7  116.6  121.0    Sodium 137  140  138    Potassium 3.8  3.8  3.8    Chloride 99  106  102    Calcium 9.5  8.6  9.1    Total Protein 8.4      Albumin 4.2      Globulin 4.2      Total Bilirubin 0.3      Alkaline Phosphatase 85      AST (SGOT) 58      ALT (SGPT) 54      Albumin/Globulin Ratio 1.0      BUN/Creatinine Ratio 13.6  13.9  11.4    Anion Gap 12.9  12.7  13.7      Assessment & Plan   Assessment / Plan     Assessment/Plan:  Acute infectious severe proximal sigmoid diverticulitis with associated microperforation   Intractable abdominal pain requiring IV narcotics  Tobacco dependence  Metabolic acidosis  Alcohol use, no history of withdrawal and has not drank since Wednesday  Family history of colon cancer, follows with Dr. Mcclelland and had a colonoscopy this year     Continue to monitor in the hospital for management of the above  General surgery following, appreciate assistance  Keep n.p.o. for now, will allow ice chips and sips with meds, advance per general surgery recommendations  Continue IV morphine 2 mg as needed for moderate pain, 4 mg as needed for severe pain  Oral Norco as needed for pain  Continue IV Zosyn, will likely require 10-14 days of antibiotics  Continue LR at 75 cc/h until he is able to tolerate oral diet  Trend renal function and electrolytes with a.m. BMP, magnesium   Trend Hgb and WBC with a.m. CBC     Discussed case with: Bedside RN, general surgery    DVT prophylaxis:  Medical and mechanical DVT prophylaxis orders are present.    CODE STATUS:   Level Of Support Discussed With: Patient  Code Status (Patient has no pulse and is not breathing): CPR (Attempt to  Resuscitate)  Medical Interventions (Patient has pulse or is breathing): Full Support  Release to patient: Routine Release

## 2023-08-08 ENCOUNTER — READMISSION MANAGEMENT (OUTPATIENT)
Dept: CALL CENTER | Facility: HOSPITAL | Age: 38
End: 2023-08-08
Payer: COMMERCIAL

## 2023-08-08 VITALS
WEIGHT: 210.54 LBS | HEART RATE: 86 BPM | HEIGHT: 73 IN | OXYGEN SATURATION: 97 % | SYSTOLIC BLOOD PRESSURE: 145 MMHG | DIASTOLIC BLOOD PRESSURE: 83 MMHG | RESPIRATION RATE: 18 BRPM | BODY MASS INDEX: 27.9 KG/M2 | TEMPERATURE: 98 F

## 2023-08-08 LAB
ANION GAP SERPL CALCULATED.3IONS-SCNC: 12.4 MMOL/L (ref 5–15)
BASOPHILS # BLD AUTO: 0.05 10*3/MM3 (ref 0–0.2)
BASOPHILS NFR BLD AUTO: 0.8 % (ref 0–1.5)
BUN SERPL-MCNC: 8 MG/DL (ref 6–20)
BUN/CREAT SERPL: 10.4 (ref 7–25)
CALCIUM SPEC-SCNC: 9.1 MG/DL (ref 8.6–10.5)
CHLORIDE SERPL-SCNC: 102 MMOL/L (ref 98–107)
CO2 SERPL-SCNC: 21.6 MMOL/L (ref 22–29)
CREAT SERPL-MCNC: 0.77 MG/DL (ref 0.76–1.27)
DEPRECATED RDW RBC AUTO: 36.9 FL (ref 37–54)
EGFRCR SERPLBLD CKD-EPI 2021: 117.5 ML/MIN/1.73
EOSINOPHIL # BLD AUTO: 0.13 10*3/MM3 (ref 0–0.4)
EOSINOPHIL NFR BLD AUTO: 2.1 % (ref 0.3–6.2)
ERYTHROCYTE [DISTWIDTH] IN BLOOD BY AUTOMATED COUNT: 12.7 % (ref 12.3–15.4)
GLUCOSE SERPL-MCNC: 88 MG/DL (ref 65–99)
HCT VFR BLD AUTO: 41 % (ref 37.5–51)
HGB BLD-MCNC: 14.7 G/DL (ref 13–17.7)
IMM GRANULOCYTES # BLD AUTO: 0.07 10*3/MM3 (ref 0–0.05)
IMM GRANULOCYTES NFR BLD AUTO: 1.2 % (ref 0–0.5)
LYMPHOCYTES # BLD AUTO: 1.44 10*3/MM3 (ref 0.7–3.1)
LYMPHOCYTES NFR BLD AUTO: 23.8 % (ref 19.6–45.3)
MAGNESIUM SERPL-MCNC: 1.8 MG/DL (ref 1.6–2.6)
MCH RBC QN AUTO: 28.9 PG (ref 26.6–33)
MCHC RBC AUTO-ENTMCNC: 35.9 G/DL (ref 31.5–35.7)
MCV RBC AUTO: 80.6 FL (ref 79–97)
MONOCYTES # BLD AUTO: 0.88 10*3/MM3 (ref 0.1–0.9)
MONOCYTES NFR BLD AUTO: 14.5 % (ref 5–12)
NEUTROPHILS NFR BLD AUTO: 3.48 10*3/MM3 (ref 1.7–7)
NEUTROPHILS NFR BLD AUTO: 57.6 % (ref 42.7–76)
NRBC BLD AUTO-RTO: 0 /100 WBC (ref 0–0.2)
PHOSPHATE SERPL-MCNC: 3.6 MG/DL (ref 2.5–4.5)
PLATELET # BLD AUTO: 353 10*3/MM3 (ref 140–450)
PMV BLD AUTO: 10 FL (ref 6–12)
POTASSIUM SERPL-SCNC: 3.9 MMOL/L (ref 3.5–5.2)
RBC # BLD AUTO: 5.09 10*6/MM3 (ref 4.14–5.8)
SODIUM SERPL-SCNC: 136 MMOL/L (ref 136–145)
WBC NRBC COR # BLD: 6.05 10*3/MM3 (ref 3.4–10.8)

## 2023-08-08 PROCEDURE — 25010000002 PIPERACILLIN SOD-TAZOBACTAM PER 1 G: Performed by: INTERNAL MEDICINE

## 2023-08-08 PROCEDURE — 84100 ASSAY OF PHOSPHORUS: CPT | Performed by: INTERNAL MEDICINE

## 2023-08-08 PROCEDURE — 83735 ASSAY OF MAGNESIUM: CPT | Performed by: INTERNAL MEDICINE

## 2023-08-08 PROCEDURE — 25010000002 ENOXAPARIN PER 10 MG: Performed by: INTERNAL MEDICINE

## 2023-08-08 PROCEDURE — 80048 BASIC METABOLIC PNL TOTAL CA: CPT | Performed by: INTERNAL MEDICINE

## 2023-08-08 PROCEDURE — 99231 SBSQ HOSP IP/OBS SF/LOW 25: CPT | Performed by: NURSE PRACTITIONER

## 2023-08-08 PROCEDURE — 85025 COMPLETE CBC W/AUTO DIFF WBC: CPT | Performed by: INTERNAL MEDICINE

## 2023-08-08 RX ORDER — METRONIDAZOLE 500 MG/1
500 TABLET ORAL 3 TIMES DAILY
Qty: 15 TABLET | Refills: 0 | Status: SHIPPED | OUTPATIENT
Start: 2023-08-08 | End: 2023-08-13

## 2023-08-08 RX ORDER — LEVOFLOXACIN 500 MG/1
500 TABLET, FILM COATED ORAL DAILY
Qty: 5 TABLET | Refills: 0 | Status: SHIPPED | OUTPATIENT
Start: 2023-08-08 | End: 2023-08-13

## 2023-08-08 RX ADMIN — PIPERACILLIN AND TAZOBACTAM 3.38 G: 3; .375 INJECTION, POWDER, LYOPHILIZED, FOR SOLUTION INTRAVENOUS at 08:45

## 2023-08-08 RX ADMIN — PIPERACILLIN AND TAZOBACTAM 3.38 G: 3; .375 INJECTION, POWDER, LYOPHILIZED, FOR SOLUTION INTRAVENOUS at 01:13

## 2023-08-08 RX ADMIN — ENOXAPARIN SODIUM 40 MG: 100 INJECTION SUBCUTANEOUS at 08:45

## 2023-08-08 NOTE — PLAN OF CARE
Goal Outcome Evaluation:           Progress: improving  Outcome Evaluation: pt had gi soft, low residue diet. no new complaints.Raquel Sanchez RN

## 2023-08-08 NOTE — PROGRESS NOTES
"PROGRESS NOTE     Patient Name:  Jose Lyn  YOB: 1985  [id number]   LOS: 3 days   * No surgery found *  Patient Care Team:  Tyron Jason MD as PCP - General (Internal Medicine)  Anita Martinez APRN as Nurse Practitioner (Nurse Practitioner)      Reason for Consult/Chief complaint: abd pain     Subjective     Interval History: VSS, afebrile, WBC 6, pain minimal, tolerating low residue diet      Review of Systems:      A complete review of systems was performed and all are negative except what is documented in the HPI.       Objective         Physical Exam:     Constitutional:  well nourished, no acute distress, appear stated age /82 (BP Location: Right arm, Patient Position: Lying)   Pulse 86   Temp 98 øF (36.7 øC) (Oral)   Resp 18   Ht 185.4 cm (73\")   Wt 95.5 kg (210 lb 8.6 oz)   SpO2 97%   BMI 27.78 kg/mý    Eyes:  anicteric sclerae, moist conjunctivae, no lid lag, PERRLA  ENMT:  oropharynx clear, moist mucous membranes, good dentition  Neck:   full ROM, trachea midline, no thyromegaly  Cardiovascular: RRR, S1 and S2 present, no MRG, heart rate 86, no pedal edema  Respiratory: lungs CTA, respirations even and unlabored  GI:  Abdomen soft, nontender, nondistended, no HSM     Skin:  warm and dry, normal turgor, no rashes  Psychiatric:  alert and oriented x3, intact judgment and insight, cooperative    Results Review:      I reviewed the patient's new clinical results including CBC, BMP.  LABS:  WBC   Date Value Ref Range Status   08/08/2023 6.05 3.40 - 10.80 10*3/mm3 Final     RBC   Date Value Ref Range Status   08/08/2023 5.09 4.14 - 5.80 10*6/mm3 Final     Hemoglobin   Date Value Ref Range Status   08/08/2023 14.7 13.0 - 17.7 g/dL Final     Hematocrit   Date Value Ref Range Status   08/08/2023 41.0 37.5 - 51.0 % Final     MCV   Date Value Ref Range Status   08/08/2023 80.6 79.0 - 97.0 fL Final     MCH   Date Value Ref Range Status   08/08/2023 28.9 26.6 - 33.0 pg " Final     MCHC   Date Value Ref Range Status   08/08/2023 35.9 (H) 31.5 - 35.7 g/dL Final     RDW   Date Value Ref Range Status   08/08/2023 12.7 12.3 - 15.4 % Final     RDW-SD   Date Value Ref Range Status   08/08/2023 36.9 (L) 37.0 - 54.0 fl Final     MPV   Date Value Ref Range Status   08/08/2023 10.0 6.0 - 12.0 fL Final     Platelets   Date Value Ref Range Status   08/08/2023 353 140 - 450 10*3/mm3 Final     Neutrophil %   Date Value Ref Range Status   08/08/2023 57.6 42.7 - 76.0 % Final     Lymphocyte %   Date Value Ref Range Status   08/08/2023 23.8 19.6 - 45.3 % Final     Monocyte %   Date Value Ref Range Status   08/08/2023 14.5 (H) 5.0 - 12.0 % Final     Eosinophil %   Date Value Ref Range Status   08/08/2023 2.1 0.3 - 6.2 % Final     Basophil %   Date Value Ref Range Status   08/08/2023 0.8 0.0 - 1.5 % Final     Immature Grans %   Date Value Ref Range Status   08/08/2023 1.2 (H) 0.0 - 0.5 % Final     Neutrophils, Absolute   Date Value Ref Range Status   08/08/2023 3.48 1.70 - 7.00 10*3/mm3 Final     Lymphocytes, Absolute   Date Value Ref Range Status   08/08/2023 1.44 0.70 - 3.10 10*3/mm3 Final     Monocytes, Absolute   Date Value Ref Range Status   08/08/2023 0.88 0.10 - 0.90 10*3/mm3 Final     Eosinophils, Absolute   Date Value Ref Range Status   08/08/2023 0.13 0.00 - 0.40 10*3/mm3 Final     Basophils, Absolute   Date Value Ref Range Status   08/08/2023 0.05 0.00 - 0.20 10*3/mm3 Final     Immature Grans, Absolute   Date Value Ref Range Status   08/08/2023 0.07 (H) 0.00 - 0.05 10*3/mm3 Final     nRBC   Date Value Ref Range Status   08/08/2023 0.0 0.0 - 0.2 /100 WBC Final         Basic Metabolic Panel    Sodium Sodium   Date Value Ref Range Status   08/08/2023 136 136 - 145 mmol/L Final   08/07/2023 138 136 - 145 mmol/L Final   08/06/2023 140 136 - 145 mmol/L Final      Potassium Potassium   Date Value Ref Range Status   08/08/2023 3.9 3.5 - 5.2 mmol/L Final   08/07/2023 3.8 3.5 - 5.2 mmol/L Final    08/06/2023 3.8 3.5 - 5.2 mmol/L Final      Chloride Chloride   Date Value Ref Range Status   08/08/2023 102 98 - 107 mmol/L Final   08/07/2023 102 98 - 107 mmol/L Final   08/06/2023 106 98 - 107 mmol/L Final      Bicarbonate No results found for: PLASMABICARB   BUN BUN   Date Value Ref Range Status   08/08/2023 8 6 - 20 mg/dL Final   08/07/2023 8 6 - 20 mg/dL Final   08/06/2023 11 6 - 20 mg/dL Final      Creatinine Creatinine   Date Value Ref Range Status   08/08/2023 0.77 0.76 - 1.27 mg/dL Final   08/07/2023 0.70 (L) 0.76 - 1.27 mg/dL Final   08/06/2023 0.79 0.76 - 1.27 mg/dL Final      Calcium Calcium   Date Value Ref Range Status   08/08/2023 9.1 8.6 - 10.5 mg/dL Final   08/07/2023 9.1 8.6 - 10.5 mg/dL Final   08/06/2023 8.6 8.6 - 10.5 mg/dL Final      Glucose      No components found for: GLUCOSE.*         IMAGING:  Imaging Results (Last 72 Hours)       Procedure Component Value Units Date/Time    CT Abdomen Pelvis With Contrast [[id number]] Collected: 08/05/23 0937     Updated: 08/05/23 0940    Narrative:      PROCEDURE: CT ABDOMEN PELVIS W CONTRAST     COMPARISON: [facility], CT, ABDOMEN/PELVIS WITH CONTRAST, 6/16/2017, 18:34.     INDICATIONS: eval LLQ & RLQ pain, acute diarrhea     TECHNIQUE: After obtaining the patient's consent, CT images were created with non-ionic intravenous   contrast material.       PROTOCOL:   Standard imaging protocol performed      RADIATION:   DLP: 514 mGy*cm    Automated exposure control was utilized to minimize radiation dose.   CONTRAST: 100 cc Isovue 370 I.V.     FINDINGS:   The lung bases are clear bilaterally.     Diffuse hepatic steatosis is noted.  No focal hepatic lesion is seen.  The gallbladder, spleen,   pancreas and adrenal glands appear unremarkable.  Both kidneys appear normal.       No adenopathy or free fluid is seen in the abdomen or pelvis.  Moderate inflammatory changes are   noted surrounding the proximal sigmoid colon.  There are small foci of  gas in this region   consistent with a small perforation.  No abscess is seen peer the bowel otherwise appears   unremarkable.  The appendix appears within normal limits.  The lack of oral contrast limits   evaluation of the bowel.     No focal osseous lesion is seen.       Impression:         1. Prominent inflammatory changes surrounding the proximal sigmoid colon favored to be secondary to   acute diverticulitis or colitis.  Underlying neoplasm is not definitively excluded.  2. Evidence of associated micro perforation with no abscess identified.  3. Diffuse hepatic steatosis            Sang Reddy M.D.         Electronically Signed and Approved By: Sang Reddy M.D. on 8/05/2023 at 9:37                             Medications:    Current Facility-Administered Medications:     acetaminophen (TYLENOL) tablet 650 mg, 650 mg, Oral, Q4H PRN, Guille Durant MD    calcium carbonate (TUMS) chewable tablet 500 mg (200 mg elemental), 2 tablet, Oral, BID PRN, Guille Durant MD    Enoxaparin Sodium (LOVENOX) syringe 40 mg, 40 mg, Subcutaneous, Daily, Guille Durant MD, 40 mg at 08/07/23 0836    HYDROcodone-acetaminophen (NORCO) 5-325 MG per tablet 1 tablet, 1 tablet, Oral, Q4H PRN, Guille Durant MD    HYDROcodone-acetaminophen (NORCO) 7.5-325 MG per tablet 1 tablet, 1 tablet, Oral, Q4H PRN, Guille Durant MD    lactated ringers infusion, 75 mL/hr, Intravenous, Continuous, Guille Durant MD, Last Rate: 75 mL/hr at 08/07/23 2112, 75 mL/hr at 08/07/23 2112    melatonin tablet 5 mg, 5 mg, Oral, Nightly PRN, Guille Durant MD    morphine injection 2 mg, 2 mg, Intravenous, Q4H PRN, Guille Durant MD    morphine injection 4 mg, 4 mg, Intravenous, Q4H PRN, Guille Durant MD    ondansetron (ZOFRAN) injection 4 mg, 4 mg, Intravenous, Q4H PRN, Guille Durant MD    Pharmacy to Dose Zosyn, , Does not apply, Continuous PRN, Guille Durant MD    piperacillin-tazobactam (ZOSYN) 3.375  g/100 mL 0.9% NS IVPB (mbp), 3.375 g, Intravenous, Q8H, Guille Durant MD, 3.375 g at 08/08/23 0113    sodium chloride 0.9 % flush 10 mL, 10 mL, Intravenous, PRN, Lazaro Rashid MD    [COMPLETED] Insert Peripheral IV, , , Once **AND** sodium chloride 0.9 % flush 10 mL, 10 mL, Intravenous, PRN, Lazaro Rashid MD    sodium chloride 0.9 % flush 10 mL, 10 mL, Intravenous, Q12H, Guille Durant MD, 10 mL at 08/07/23 0836    sodium chloride 0.9 % flush 10 mL, 10 mL, Intravenous, PRN, Guille Durant MD    sodium chloride 0.9 % infusion 40 mL, 40 mL, Intravenous, PRN, Guille Durant MD    Assessment & Plan       Diverticulitis of colon with perforation   Ok to NE home on oral antibiotics   Follow up Monday or Tuesday of next week with VIANEY Bustos  08/08/23  08:13 EDT    Electronically signed by VIANEY Zacarias, 08/08/23, 8:13 AM EDT.

## 2023-08-08 NOTE — DISCHARGE SUMMARY
[facility]         HOSPITALIST  DISCHARGE SUMMARY    Patient Name: Jose Lyn  : 1985  MRN: [id number]    Date of Admission: 2023  Date of Discharge:  23  Primary Care Physician: Tyron Jason MD    Consults       Date and Time Order Name Status Description    2023 11:16 AM Inpatient General Surgery Consult      2023 10:15 AM Surgery (on-call MD unless specified)      2023 10:15 AM Hospitalist (on-call MD unless specified)              Active and Resolved Hospital Problems:  Acute sigmoid diverticulitis with associated microperforation   Intractable abdominal pain  Metabolic acidosis  Tobacco dependence    Hospital Course     Hospital Course:  Jose Lyn is a 37 y.o. male with no past medical history who presented with 1 week of abdominal pain and diarrhea.  On presentation, vital signs stable.  CT abdomen pelvis revealed sigmoid diverticulitis with microperforation.  General surgery consulted.  Admitted on bowel rest, IV fluids and empiric antibiotics. Abdominal pain controlled with a combination of oral and IV narcotics.  C. difficile negative.  Improved with medical management.  Diet gradually advanced without issue.  Made afebrile without leukocytosis.  Ambulated independently.  Cleared from general surgery perspective.  Transitioned to oral antibiotic regimen on discharge to complete course.  Of note is already established with gastroenterology and had a colonoscopy earlier in the year. Will have follow-up next week with general surgery.  At home care, medications, follow-up instructions provided.  Discharged home in stable condition.    Day of Discharge     Vital Signs:  Temp:  [97.5 øF (36.4 øC)-98.2 øF (36.8 øC)] 98 øF (36.7 øC)  Heart Rate:  [49-86] 86  Resp:  [16-18] 18  BP: (126-161)/(77-89) 145/83    Physical Exam:   GENERAL: The patient is conversant and nontoxic.  HEENT: PERRLA, Oropharynx clear. Moist mucous membranes.   HEART: Regular rate  and rhythm. No edema  LUNGS: Equal air entry, clear to auscultation bilaterally.  ABDOMEN: Soft, positive bowel sounds, nontender, nondistended  SKIN: No rash or open wounds   NEUROLOGIC: Alert, cranial nerves grossly intact, speech clear    Discharge Details        Discharge Medications        New Medications        Instructions Start Date   levoFLOXacin 500 MG tablet  Commonly known as: Levaquin   500 mg, Oral, Daily      metroNIDAZOLE 500 MG tablet  Commonly known as: Flagyl   500 mg, Oral, 3 Times Daily               Allergies   Allergen Reactions    Naproxen GI Intolerance       Discharge Disposition:  Home or Self Care    Diet:  Hospital:  Diet Order   Procedures    Diet: Gastrointestinal Diets; Fiber-Restricted; Texture: Regular Texture (IDDSI 7); Fluid Consistency: Thin (IDDSI 0)       Discharge Activity:   Activity Instructions       Activity as Tolerated              CODE STATUS:  Code Status and Medical Interventions:   Ordered at: 08/05/23 1116     Level Of Support Discussed With:    Patient     Code Status (Patient has no pulse and is not breathing):    CPR (Attempt to Resuscitate)     Medical Interventions (Patient has pulse or is breathing):    Full Support     Release to patient:    Routine Release         Future Appointments   Date Time Provider Department Center   8/29/2023  8:30 AM Anita Martinez APRN [facility]   12/14/2023  4:15 PM Tyron Jason MD [facility]       Additional Instructions for the Follow-ups that You Need to Schedule       Discharge Follow-up with Specialty: Dr. King next Monday or Tuesday   As directed      Specialty: Dr. King next Monday or Tuesday                Pertinent  and/or Most Recent Results     PROCEDURES:   Colonscopy Feb 2022  - One 4 mm polyp in the rectum, removed with a cold snare. Resected and retrieved.  - Diverticulosis in the entire examined colon.  - Internal hemorrhoids.    LAB RESULTS:      Lab 08/08/23  0407 08/07/23  0432  08/06/23  0412 08/05/23  0744   WBC 6.05 6.74 6.30 7.87   HEMOGLOBIN 14.7 14.5 13.4 14.6   HEMATOCRIT 41.0 40.5 37.9 39.6   PLATELETS 353 309 284 275   NEUTROS ABS 3.48 4.06 3.49 4.96   IMMATURE GRANS (ABS) 0.07* 0.05 0.06* 0.08*   LYMPHS ABS 1.44 1.55 1.46 1.34   MONOS ABS 0.88 0.86 1.02* 1.29*   EOS ABS 0.13 0.16 0.22 0.14   MCV 80.6 80.0 80.6 79.0         Lab 08/08/23  0407 08/07/23  0439 08/06/23 0412 08/05/23  0744   SODIUM 136 138 140 137   POTASSIUM 3.9 3.8 3.8 3.8   CHLORIDE 102 102 106 99   CO2 21.6* 22.3 21.3* 25.1   ANION GAP 12.4 13.7 12.7 12.9   BUN 8 8 11 11   CREATININE 0.77 0.70* 0.79 0.81   EGFR 117.5 121.0 116.6 115.7   GLUCOSE 88 86 94 158*   CALCIUM 9.1 9.1 8.6 9.5   MAGNESIUM 1.8 1.9 2.1  --    PHOSPHORUS 3.6 3.3  --   --          Lab 08/05/23  0744   TOTAL PROTEIN 8.4   ALBUMIN 4.2   GLOBULIN 4.2   ALT (SGPT) 54*   AST (SGOT) 58*   BILIRUBIN 0.3   ALK PHOS 85   LIPASE 27                     Brief Urine Lab Results  (Last result in the past 365 days)        Color   Clarity   Blood   Leuk Est   Nitrite   Protein   CREAT   Urine HCG        08/05/23 0830 Dark Yellow   Clear   Negative   Trace   Negative   >=300 mg/dL (3+)                 Microbiology Results (last 10 days)       Procedure Component Value - Date/Time    Clostridioides difficile Toxin - Stool, Per Rectum [[id number]] Collected: 08/05/23 2159    Lab Status: Final result Specimen: Stool from Per Rectum Updated: 08/05/23 9670    Narrative:      The following orders were created for panel order Clostridioides difficile Toxin - Stool, Per Rectum.  Procedure                               Abnormality         Status                     ---------                               -----------         ------                     Clostridioides difficile...[[id number]]                      Final result                 Please view results for these tests on the individual orders.    Enteric Bacterial Panel - Stool, Per Rectum [[id number]]  (Normal)  Collected: 08/05/23 2159    Lab Status: Final result Specimen: Stool from Per Rectum Updated: 08/06/23 1035     Salmonella Not Detected     Campylobacter Not Detected     Shigella/Enteroinvasive E. coli (EIEC) Not Detected     Shiga-like toxin-producing E. coli (STEC) stx1/stx2 Not Detected    Clostridioides difficile Toxin, PCR - Stool, Per Rectum [[id number]] Collected: 08/05/23 2159    Lab Status: Final result Specimen: Stool from Per Rectum Updated: 08/05/23 2335     Toxigenic C. difficile by PCR Negative     027 Toxin Presumptive Negative    Narrative:      The result indicates the absence of toxigenic C. difficile from stool specimen.             CT Abdomen Pelvis With Contrast    Result Date: 8/5/2023  Impression:   1. Prominent inflammatory changes surrounding the proximal sigmoid colon favored to be secondary to acute diverticulitis or colitis.  Underlying neoplasm is not definitively excluded. 2. Evidence of associated micro perforation with no abscess identified. 3. Diffuse hepatic steatosis     Sang Reddy M.D.       Electronically Signed and Approved By: Sang Reddy M.D. on 8/05/2023 at 9:37              Results for orders placed during the hospital encounter of 06/07/23    Duplex Venous Lower Extremity - Right CAR    Interpretation Summary    Normal right lower extremity venous duplex scan.      Results for orders placed during the hospital encounter of 06/07/23    Duplex Venous Lower Extremity - Right CAR    Interpretation Summary    Normal right lower extremity venous duplex scan.        Labs Pending at Discharge: NONE    Time spent on Discharge including face to face service: >30 minutes    Electronically signed by Phillip Fuentes DO, 08/08/23, 10:23 AM EDT.       no

## 2023-08-08 NOTE — OUTREACH NOTE
Prep Survey      Flowsheet Row Responses   [facility] patient discharged from? [facility]   Is LACE score < 7 ? No   Eligibility [facility]   Date of Admission 08/05/23   Date of Discharge 08/08/23   Discharge Disposition Home or Self Care   Discharge diagnosis Diverticulitis of colon with perforation   Does the patient have one of the following disease processes/diagnoses(primary or secondary)? Other   Does the patient have Home health ordered? No   Is there a DME ordered? No   Prep survey completed? Yes            Nuvia CHURCH - Registered Nurse

## 2023-08-09 ENCOUNTER — TRANSITIONAL CARE MANAGEMENT TELEPHONE ENCOUNTER (OUTPATIENT)
Dept: CALL CENTER | Facility: HOSPITAL | Age: 38
End: 2023-08-09
Payer: COMMERCIAL

## 2023-08-09 NOTE — OUTREACH NOTE
Call Center TCM Note      Flowsheet Row Responses   [facility] patient discharged from? [facility]   Does the patient have one of the following disease processes/diagnoses(primary or secondary)? Other   TCM attempt successful? Yes   Call start time 1350   Call end time 1353   Discharge diagnosis Diverticulitis of colon with perforation   Meds reviewed with patient/caregiver? Yes   Is the patient having any side effects they believe may be caused by any medication additions or changes? No   Does the patient have all medications ordered at discharge? Yes   Is the patient taking all medications as directed (includes completed medication regime)? Yes   Does the patient have an appointment with their PCP within 7-14 days of discharge? No   Nursing Interventions Assisted patient with making appointment per protocol, Routed TCM call to PCP office  [Patient needed a Monday or Wednesday appointment after 3:00pm. First available date was 8/28/23. Call routed to office to see if they can work him in sooner.]   Has home health visited the patient within 72 hours of discharge? N/A   Psychosocial issues? Yes   Did the patient receive a copy of their discharge instructions? Yes   Nursing interventions Reviewed instructions with patient   What is the patient's perception of their health status since discharge? Improving   Is the patient/caregiver able to teach back signs and symptoms related to disease process for when to call PCP? Yes   Is the patient/caregiver able to teach back signs and symptoms related to disease process for when to call 911? Yes   Is the patient/caregiver able to teach back the hierarchy of who to call/visit for symptoms/problems? PCP, Specialist, Home health nurse, Urgent Care, ED, 911 Yes   TCM call completed? Yes   Call end time 1353   Would this patient benefit from a Referral to Amb Social Work? No   Is the patient interested in additional calls from an ambulatory ? Sintia FRANKLIN  DENISE Robb    8/9/2023, 14:01 EDT

## 2023-08-10 ENCOUNTER — TELEPHONE (OUTPATIENT)
Dept: INTERNAL MEDICINE | Facility: CLINIC | Age: 38
End: 2023-08-10

## 2023-08-10 NOTE — TELEPHONE ENCOUNTER
Caller: [employer]    Relationship:     Best call back number: [phone]    What form or medical record are you requesting: DIAGNOSIS CODES, VISIT NOTES, START AND END DATE OF DISABILITY     Who is requesting this form or medical record from you:[employer]    How would you like to receive the form or medical records (pick-up, mail, fax): FAX: [phone]      Timeframe paperwork needed:AS SOON AS POSSIBLE    Additional notes: INSURANCE COMPANY WILL FAX OVER A REQUEST FOR THIS INFORMATION TODAY 8.10.2023

## 2023-08-14 ENCOUNTER — OFFICE VISIT (OUTPATIENT)
Dept: SURGERY | Facility: CLINIC | Age: 38
End: 2023-08-14
Payer: COMMERCIAL

## 2023-08-14 ENCOUNTER — TELEPHONE (OUTPATIENT)
Dept: INTERNAL MEDICINE | Facility: CLINIC | Age: 38
End: 2023-08-14
Payer: COMMERCIAL

## 2023-08-14 VITALS — BODY MASS INDEX: 27.96 KG/M2 | WEIGHT: 211 LBS | RESPIRATION RATE: 14 BRPM | HEIGHT: 73 IN

## 2023-08-14 DIAGNOSIS — K57.20 DIVERTICULITIS OF COLON WITH PERFORATION: Primary | ICD-10-CM

## 2023-08-14 PROCEDURE — 99212 OFFICE O/P EST SF 10 MIN: CPT | Performed by: SURGERY

## 2023-08-14 NOTE — TELEPHONE ENCOUNTER
Caller: JESSICA    [facility]    Best call back number: [phone]     What is the best time to reach you: ANY    Who are you requesting to speak with (clinical staff, provider,  specific staff member): CLINICAL STAFF    What was the call regarding: [facility] CALLED WANTING TO KNOW THE DATE THAT THE PATIENT WAS TAKEN OFF WORK.

## 2023-08-14 NOTE — LETTER
August 14, 2023     Patient: Jose Lyn   YOB: 1985   Date of Visit: 8/14/2023       To Whom It May Concern:    It is my medical opinion that Jose Lyn should remain out of work until 8/21/23 .           Sincerely,        Parag King MD    CC:   No Recipients

## 2023-08-14 NOTE — PROGRESS NOTES
"Chief Complaint  Colon Perforation and Post-op    Subjective          Jose Lyn presents to [facility] GENERAL SURGERY  History of Present Illness    Jose Lyn is a 37 y.o. male  who presents today for a postoperative visit.     Patient is here for a follow-up after a recent admission for sigmoid diverticulitis.  His abdominal pain has continued to improve but overall he is just not feeling very well.  He does not have a good energy level.    Past History:  Medical History: has a past medical history of Colon perforation, Gastritis, Mixed hyperlipidemia, and Vitamin D deficiency, unspecified.   Surgical History: has a past surgical history that includes Colonoscopy; Rotator cuff repair (Left, 03/25/2015); Other surgical history (01/06/2015); Welcome tooth extraction; Esophagogastroduodenoscopy (N/A, 2/21/2023); and Colonoscopy (N/A, 2/21/2023).   Family History: family history includes Colon cancer in an other family member; Colon cancer (age of onset: 35) in his brother; Diabetes in his mother; Heart failure in his father and mother; Stroke in his father and mother.   Social History: reports that he has been smoking cigarettes. He has a 9.00 pack-year smoking history. He has never used smokeless tobacco. He reports current alcohol use of about 30.0 standard drinks per week. He reports that he does not use drugs.  Allergies: Naproxen     No current outpatient medications on file.       Physical Exam  He appears well and his abdomen is nontender.  Objective     Vital Signs:   Resp 14   Ht 185.4 cm (72.99\")   Wt 95.7 kg (211 lb)   BMI 27.84 kg/mý              Assessment and Plan    Diagnoses and all orders for this visit:    1. Diverticulitis of colon with perforation (Primary)    We will go ahead and check a CBC today as he still feels pretty peaked.  We will repeat his CT and make sure that his inflammation is improving.      "

## 2023-08-15 NOTE — TELEPHONE ENCOUNTER
Pt's first day out of work was 8/5/2023 discharged on 8/8/2023, Dr. Diallo states that he can go back on 8/21/2023.  I have called [employer] and let them know.

## 2023-08-16 ENCOUNTER — READMISSION MANAGEMENT (OUTPATIENT)
Dept: CALL CENTER | Facility: HOSPITAL | Age: 38
End: 2023-08-16
Payer: COMMERCIAL

## 2023-08-18 ENCOUNTER — LAB (OUTPATIENT)
Dept: LAB | Facility: HOSPITAL | Age: 38
End: 2023-08-18
Payer: COMMERCIAL

## 2023-08-18 DIAGNOSIS — K57.20 DIVERTICULITIS OF COLON WITH PERFORATION: ICD-10-CM

## 2023-08-18 LAB
BASOPHILS # BLD AUTO: 0.12 10*3/MM3 (ref 0–0.2)
BASOPHILS NFR BLD AUTO: 1.6 % (ref 0–1.5)
DEPRECATED RDW RBC AUTO: 38.4 FL (ref 37–54)
EOSINOPHIL # BLD AUTO: 0.25 10*3/MM3 (ref 0–0.4)
EOSINOPHIL NFR BLD AUTO: 3.4 % (ref 0.3–6.2)
ERYTHROCYTE [DISTWIDTH] IN BLOOD BY AUTOMATED COUNT: 13.3 % (ref 12.3–15.4)
HCT VFR BLD AUTO: 47.3 % (ref 37.5–51)
HGB BLD-MCNC: 16.4 G/DL (ref 13–17.7)
IMM GRANULOCYTES # BLD AUTO: 0.07 10*3/MM3 (ref 0–0.05)
IMM GRANULOCYTES NFR BLD AUTO: 0.9 % (ref 0–0.5)
LYMPHOCYTES # BLD AUTO: 2.69 10*3/MM3 (ref 0.7–3.1)
LYMPHOCYTES NFR BLD AUTO: 36.2 % (ref 19.6–45.3)
MCH RBC QN AUTO: 28.3 PG (ref 26.6–33)
MCHC RBC AUTO-ENTMCNC: 34.7 G/DL (ref 31.5–35.7)
MCV RBC AUTO: 81.7 FL (ref 79–97)
MONOCYTES # BLD AUTO: 0.78 10*3/MM3 (ref 0.1–0.9)
MONOCYTES NFR BLD AUTO: 10.5 % (ref 5–12)
NEUTROPHILS NFR BLD AUTO: 3.52 10*3/MM3 (ref 1.7–7)
NEUTROPHILS NFR BLD AUTO: 47.4 % (ref 42.7–76)
NRBC BLD AUTO-RTO: 0 /100 WBC (ref 0–0.2)
PLATELET # BLD AUTO: 384 10*3/MM3 (ref 140–450)
PMV BLD AUTO: 10.2 FL (ref 6–12)
RBC # BLD AUTO: 5.79 10*6/MM3 (ref 4.14–5.8)
WBC NRBC COR # BLD: 7.43 10*3/MM3 (ref 3.4–10.8)

## 2023-08-18 PROCEDURE — 85025 COMPLETE CBC W/AUTO DIFF WBC: CPT

## 2023-08-18 PROCEDURE — 36415 COLL VENOUS BLD VENIPUNCTURE: CPT

## 2023-08-27 PROBLEM — Z09 HOSPITAL DISCHARGE FOLLOW-UP: Status: ACTIVE | Noted: 2023-08-27

## 2023-08-27 PROBLEM — L03.115 CELLULITIS OF RIGHT LOWER EXTREMITY: Status: RESOLVED | Noted: 2023-06-15 | Resolved: 2023-08-27

## 2023-08-29 ENCOUNTER — OFFICE VISIT (OUTPATIENT)
Dept: GASTROENTEROLOGY | Facility: CLINIC | Age: 38
End: 2023-08-29
Payer: COMMERCIAL

## 2023-08-29 VITALS
DIASTOLIC BLOOD PRESSURE: 101 MMHG | WEIGHT: 216.4 LBS | BODY MASS INDEX: 28.68 KG/M2 | SYSTOLIC BLOOD PRESSURE: 144 MMHG | HEIGHT: 73 IN | HEART RATE: 82 BPM

## 2023-08-29 DIAGNOSIS — K57.92 DIVERTICULITIS: ICD-10-CM

## 2023-08-29 DIAGNOSIS — K76.0 FATTY LIVER: ICD-10-CM

## 2023-08-29 DIAGNOSIS — Z86.010 HISTORY OF COLON POLYPS: ICD-10-CM

## 2023-08-29 DIAGNOSIS — Z78.9 ALCOHOL USE: ICD-10-CM

## 2023-08-29 DIAGNOSIS — R79.89 ELEVATED LFTS: Primary | ICD-10-CM

## 2023-08-29 DIAGNOSIS — Z80.0 FAMILY HISTORY OF COLON CANCER: ICD-10-CM

## 2023-08-29 PROBLEM — Z86.0100 HISTORY OF COLON POLYPS: Status: ACTIVE | Noted: 2023-08-29

## 2023-08-29 RX ORDER — DIPHENOXYLATE HYDROCHLORIDE AND ATROPINE SULFATE 2.5; .025 MG/1; MG/1
TABLET ORAL DAILY
COMMUNITY

## 2023-08-29 NOTE — PATIENT INSTRUCTIONS
Fatty Liver Disease    The liver converts food into energy, removes toxic material from the blood, makes important proteins, and absorbs necessary vitamins from food. Fatty liver disease occurs when too much fat has built up in your liver cells. Fatty liver disease is also called hepatic steatosis.  In many cases, fatty liver disease does not cause symptoms or problems. It is often diagnosed when tests are being done for other reasons. However, over time, fatty liver can cause inflammation that may lead to more serious liver problems, such as scarring of the liver (cirrhosis) and liver failure.  Fatty liver is associated with insulin resistance, increased body fat, high blood pressure (hypertension), and high cholesterol. These are features of metabolic syndrome and increase your risk for stroke, diabetes, and heart disease.  What are the causes?  This condition may be caused by components of metabolic syndrome:  Obesity.  Insulin resistance.  High cholesterol.  Other causes:  Alcohol abuse.  Poor nutrition.  Cushing syndrome.  Pregnancy.  Certain drugs.  Poisons.  Some viral infections.  What increases the risk?  You are more likely to develop this condition if you:  Abuse alcohol.  Are overweight.  Have diabetes.  Have hepatitis.  Have a high triglyceride level.  Are pregnant.  What are the signs or symptoms?  Fatty liver disease often does not cause symptoms. If symptoms do develop, they can include:  Fatigue and weakness.  Weight loss.  Confusion.  Nausea, vomiting, or abdominal pain.  Yellowing of your skin and the white parts of your eyes (jaundice).  Itchy skin.  How is this diagnosed?  This condition may be diagnosed by:  A physical exam and your medical history.  Blood tests.  Imaging tests, such as an ultrasound, CT scan, or MRI.  A liver biopsy. A small sample of liver tissue is removed using a needle. The sample is then looked at under a microscope.  How is this treated?  Fatty liver disease is often  caused by other health conditions. Treatment for fatty liver may involve medicines and lifestyle changes to manage conditions such as:  Alcoholism.  High cholesterol.  Diabetes.  Being overweight or obese.  Follow these instructions at home:    Do not drink alcohol. If you have trouble quitting, ask your health care provider how to safely quit with the help of medicine or a supervised program. This is important to keep your condition from getting worse.  Eat a healthy diet as told by your health care provider. Ask your health care provider about working with a dietitian to develop an eating plan.  Exercise regularly. This can help you lose weight and control your cholesterol and diabetes. Talk to your health care provider about an exercise plan and which activities are best for you.  Take over-the-counter and prescription medicines only as told by your health care provider.  Keep all follow-up visits. This is important.  Contact a health care provider if:  You have trouble controlling your:  Blood sugar. This is especially important if you have diabetes.  Cholesterol.  Drinking of alcohol.  Get help right away if:  You have abdominal pain.  You have jaundice.  You have nausea and are vomiting.  You vomit blood or material that looks like coffee grounds.  You have stools that are black, tar-like, or bloody.  Summary  Fatty liver disease develops when too much fat builds up in the cells of your liver.  Fatty liver disease often causes no symptoms or problems. However, over time, fatty liver can cause inflammation that may lead to more serious liver problems, such as scarring of the liver (cirrhosis).  You are more likely to develop this condition if you abuse alcohol, are pregnant, are overweight, have diabetes, have hepatitis, or have high triglyceride or cholesterol levels.  Contact your health care provider if you have trouble controlling your blood sugar, cholesterol, or drinking of alcohol.  This information is  not intended to replace advice given to you by your health care provider. Make sure you discuss any questions you have with your health care provider.  Document Revised: 09/30/2021 Document Reviewed: 09/30/2021  Else"[employer]" Patient Education c 2023 [employer]
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
## 2023-09-12 ENCOUNTER — HOSPITAL ENCOUNTER (OUTPATIENT)
Dept: CT IMAGING | Facility: HOSPITAL | Age: 38
Discharge: HOME | End: 2023-09-12
Admitting: SURGERY
Payer: COMMERCIAL

## 2023-09-12 DIAGNOSIS — K57.20 DIVERTICULITIS OF COLON WITH PERFORATION: ICD-10-CM

## 2023-09-12 PROCEDURE — 74177 CT ABD & PELVIS W/CONTRAST: CPT

## 2023-09-12 PROCEDURE — 25510000001 IOPAMIDOL PER 1 ML: Performed by: SURGERY

## 2023-09-12 RX ORDER — METRONIDAZOLE 500 MG/1
500 TABLET ORAL 3 TIMES DAILY
Qty: 21 TABLET | Refills: 0 | Status: SHIPPED | OUTPATIENT
Start: 2023-09-12 | End: 2023-09-22

## 2023-09-12 RX ORDER — CIPROFLOXACIN 500 MG/1
500 TABLET, FILM COATED ORAL 2 TIMES DAILY
Qty: 20 TABLET | Refills: 0 | Status: SHIPPED | OUTPATIENT
Start: 2023-09-12

## 2023-09-12 RX ADMIN — IOPAMIDOL 100 ML: 755 INJECTION, SOLUTION INTRAVENOUS at 10:35

## 2023-09-13 ENCOUNTER — TELEPHONE (OUTPATIENT)
Dept: SURGERY | Facility: CLINIC | Age: 38
End: 2023-09-13
Payer: COMMERCIAL

## 2023-09-14 NOTE — TELEPHONE ENCOUNTER
PT CALLED BACK AND DUE TO WORK HE WOULD NEED AN APPT 3:15-3:30/HE HAS NO TIME AVAILABLE TO TAKE OFF WORK/PLEASE ADVISE??

## 2023-09-18 ENCOUNTER — OFFICE VISIT (OUTPATIENT)
Dept: SURGERY | Facility: CLINIC | Age: 38
End: 2023-09-18
Payer: COMMERCIAL

## 2023-09-18 VITALS — HEIGHT: 73 IN | BODY MASS INDEX: 28.93 KG/M2 | WEIGHT: 218.26 LBS | RESPIRATION RATE: 16 BRPM

## 2023-09-18 DIAGNOSIS — K57.92 DIVERTICULITIS: Primary | ICD-10-CM

## 2023-09-18 PROCEDURE — 99212 OFFICE O/P EST SF 10 MIN: CPT | Performed by: SURGERY

## 2023-09-18 NOTE — PROGRESS NOTES
Chief Complaint  Diverticulitis    Subjective          Jose Lyn presents to [facility] GENERAL SURGERY  History of Present Illness    Jose Lyn is a 37 y.o. male  who presents today for a postoperative visit.     Patient is here for a follow-up after a recent CT of the abdomen for persistent diverticulitis symptoms.  Overall that scan looked much improved but he did have a small residual fluid collection there consistent with a small abscess.  The fluid component of it was about a centimeter in diameter and I did not think that there was a good window there for aspiration.  We prescribed some antibiotics late last week but he has not gotten those filled yet.    Past History:  Medical History: has a past medical history of Colon perforation, Diverticulitis, Gastritis, Mixed hyperlipidemia, and Vitamin D deficiency, unspecified.   Surgical History: has a past surgical history that includes Colonoscopy; Rotator cuff repair (Left, 03/25/2015); Other surgical history (01/06/2015); [place] tooth extraction; Esophagogastroduodenoscopy (N/A, 2/21/2023); and Colonoscopy (N/A, 2/21/2023).   Family History: family history includes Colon cancer in an other family member; Colon cancer (age of onset: 35) in his brother; Diabetes in his mother; Heart failure in his father and mother; Stroke in his father and mother.   Social History: reports that he has been smoking cigarettes. He has a 9.00 pack-year smoking history. He has never used smokeless tobacco. He reports current alcohol use of about 10.0 standard drinks per week. He reports that he does not use drugs.  Allergies: Naproxen       Current Outpatient Medications:     ciprofloxacin (Cipro) 500 MG tablet, Take 1 tablet by mouth 2 (Two) Times a Day., Disp: 20 tablet, Rfl: 0    METAMUCIL FIBER PO, Take  by mouth., Disp: , Rfl:     metroNIDAZOLE (Flagyl) 500 MG tablet, Take 1 tablet by mouth 3 (Three) Times a Day for 10 days., Disp: 21 tablet, Rfl:  "0    multivitamin (THERAGRAN) tablet tablet, Take  by mouth Daily., Disp: , Rfl:     polyethylene glycol (GoLYTELY) 236 g solution, Take per office instructions, Disp: 4000 mL, Rfl: 0    Psyllium (DAILY FIBER PO), Take  by mouth., Disp: , Rfl:        Physical Exam  He appears well and his abdomen is soft.  Objective     Vital Signs:   Resp 16   Ht 185.4 cm (72.99\")   Wt 99 kg (218 lb 4.1 oz)   BMI 28.80 kg/m²              Assessment and Plan    Diagnoses and all orders for this visit:    1. Diverticulitis (Primary)    I will have him go ahead and  his antibiotics and we will have him take those for about 10 days.  I will see him back in about 3 weeks and hopefully by that time he will be feeling better.      "

## 2023-09-18 NOTE — LETTER
September 18, 2023     Tyron Jason MD  [address]  [place] [address]    Patient: Jose Lyn   YOB: 1985   Date of Visit: 9/18/2023     Dear Tyron Jason MD:       Thank you for referring Jose Lyn to me for evaluation. Below are the relevant portions of my assessment and plan of care.    If you have questions, please do not hesitate to call me. I look forward to following Jose along with you.         Sincerely,        Parag King MD        CC: No Recipients    Parag King MD  09/18/23 1527  Sign when Signing Visit  Chief Complaint  Diverticulitis    Subjective          Jose Lyn presents to [facility] GENERAL SURGERY  History of Present Illness    Jose Lyn is a 37 y.o. male  who presents today for a postoperative visit.     Patient is here for a follow-up after a recent CT of the abdomen for persistent diverticulitis symptoms.  Overall that scan looked much improved but he did have a small residual fluid collection there consistent with a small abscess.  The fluid component of it was about a centimeter in diameter and I did not think that there was a good window there for aspiration.  We prescribed some antibiotics late last week but he has not gotten those filled yet.    Past History:  Medical History: has a past medical history of Colon perforation, Diverticulitis, Gastritis, Mixed hyperlipidemia, and Vitamin D deficiency, unspecified.   Surgical History: has a past surgical history that includes Colonoscopy; Rotator cuff repair (Left, 03/25/2015); Other surgical history (01/06/2015); [place] tooth extraction; Esophagogastroduodenoscopy (N/A, 2/21/2023); and Colonoscopy (N/A, 2/21/2023).   Family History: family history includes Colon cancer in an other family member; Colon cancer (age of onset: 35) in his brother; Diabetes in his mother; Heart failure in his father and mother; Stroke in his father and mother.   Social History:  "reports that he has been smoking cigarettes. He has a 9.00 pack-year smoking history. He has never used smokeless tobacco. He reports current alcohol use of about 10.0 standard drinks per week. He reports that he does not use drugs.  Allergies: Naproxen       Current Outpatient Medications:   •  ciprofloxacin (Cipro) 500 MG tablet, Take 1 tablet by mouth 2 (Two) Times a Day., Disp: 20 tablet, Rfl: 0  •  METAMUCIL FIBER PO, Take  by mouth., Disp: , Rfl:   •  metroNIDAZOLE (Flagyl) 500 MG tablet, Take 1 tablet by mouth 3 (Three) Times a Day for 10 days., Disp: 21 tablet, Rfl: 0  •  multivitamin (THERAGRAN) tablet tablet, Take  by mouth Daily., Disp: , Rfl:   •  polyethylene glycol (GoLYTELY) 236 g solution, Take per office instructions, Disp: 4000 mL, Rfl: 0  •  Psyllium (DAILY FIBER PO), Take  by mouth., Disp: , Rfl:        Physical Exam  He appears well and his abdomen is soft.  Objective     Vital Signs:   Resp 16   Ht 185.4 cm (72.99\")   Wt 99 kg (218 lb 4.1 oz)   BMI 28.80 kg/m²              Assessment and Plan    Diagnoses and all orders for this visit:    1. Diverticulitis (Primary)    I will have him go ahead and  his antibiotics and we will have him take those for about 10 days.  I will see him back in about 3 weeks and hopefully by that time he will be feeling better.      "

## 2023-10-11 ENCOUNTER — TELEPHONE (OUTPATIENT)
Dept: GASTROENTEROLOGY | Facility: CLINIC | Age: 38
End: 2023-10-11
Payer: COMMERCIAL

## 2023-10-16 ENCOUNTER — OFFICE VISIT (OUTPATIENT)
Dept: SURGERY | Facility: CLINIC | Age: 38
End: 2023-10-16
Payer: COMMERCIAL

## 2023-10-16 VITALS
DIASTOLIC BLOOD PRESSURE: 107 MMHG | RESPIRATION RATE: 16 BRPM | SYSTOLIC BLOOD PRESSURE: 136 MMHG | BODY MASS INDEX: 29.42 KG/M2 | WEIGHT: 222 LBS | HEIGHT: 73 IN | HEART RATE: 87 BPM

## 2023-10-16 DIAGNOSIS — K57.92 DIVERTICULITIS: Primary | ICD-10-CM

## 2023-10-16 PROCEDURE — 99212 OFFICE O/P EST SF 10 MIN: CPT | Performed by: SURGERY

## 2023-10-16 NOTE — LETTER
October 16, 2023     Tyron Jason MD  [address]  [place] [address]    Patient: oJse Lyn   YOB: 1985   Date of Visit: 10/16/2023     Dear Tyron Jason MD:       Thank you for referring Jose Lyn to me for evaluation. Below are the relevant portions of my assessment and plan of care.    If you have questions, please do not hesitate to call me. I look forward to following Jose along with you.         Sincerely,        Parag King MD        CC: No Recipients    Parag King MD  10/16/23 1323  Sign when Signing Visit  Chief Complaint  Diverticulitis    Subjective         Jose Lyn presents to [facility] GENERAL SURGERY  History of Present Illness    Jose Lyn is a 37 y.o. male  who presents today for a postoperative visit.     Patient is here for a follow-up after a recent course of antibiotics for some persistent diverticulitis.  He is feeling much better today.  He denies any abdominal pain.  He is getting back to eating normally and his bowel movements have returned to normal.    Past History:  Medical History: has a past medical history of Colon perforation, Diverticulitis, Gastritis, Mixed hyperlipidemia, and Vitamin D deficiency, unspecified.   Surgical History: has a past surgical history that includes Colonoscopy; Rotator cuff repair (Left, 03/25/2015); Other surgical history (01/06/2015); [place] tooth extraction; Esophagogastroduodenoscopy (N/A, 2/21/2023); and Colonoscopy (N/A, 2/21/2023).   Family History: family history includes Colon cancer in an other family member; Colon cancer (age of onset: 35) in his brother; Diabetes in his mother; Heart failure in his father and mother; Stroke in his father and mother.   Social History: reports that he has been smoking cigarettes. He has a 9.00 pack-year smoking history. He has never used smokeless tobacco. He reports current alcohol use of about 10.0 standard drinks of alcohol per  "week. He reports that he does not use drugs.  Allergies: Naproxen       Current Outpatient Medications:   •  ciprofloxacin (Cipro) 500 MG tablet, Take 1 tablet by mouth 2 (Two) Times a Day., Disp: 20 tablet, Rfl: 0  •  METAMUCIL FIBER PO, Take  by mouth., Disp: , Rfl:   •  multivitamin (THERAGRAN) tablet tablet, Take  by mouth Daily., Disp: , Rfl:   •  polyethylene glycol (GoLYTELY) 236 g solution, Take per office instructions, Disp: 4000 mL, Rfl: 0  •  Psyllium (DAILY FIBER PO), Take  by mouth., Disp: , Rfl:        Physical Exam  He appears well today his abdomen is soft and it is nontender.  Objective    Vital Signs:   BP (!) 136/107 (BP Location: Right arm, Patient Position: Sitting, Cuff Size: Adult)   Pulse 87   Resp 16   Ht 185.4 cm (72.99\")   Wt 101 kg (222 lb)   BMI 29.30 kg/m²              Assessment and Plan    Diagnoses and all orders for this visit:    1. Diverticulitis (Primary)    At this point I think we can follow him expectantly.  I have asked him to call me back if he were to begin to have any more trouble.      "

## 2023-10-16 NOTE — PROGRESS NOTES
Chief Complaint  Diverticulitis    Subjective          Jose Lyn presents to [facility] GENERAL SURGERY  History of Present Illness    Jose Lyn is a 37 y.o. male  who presents today for a postoperative visit.     Patient is here for a follow-up after a recent course of antibiotics for some persistent diverticulitis.  He is feeling much better today.  He denies any abdominal pain.  He is getting back to eating normally and his bowel movements have returned to normal.    Past History:  Medical History: has a past medical history of Colon perforation, Diverticulitis, Gastritis, Mixed hyperlipidemia, and Vitamin D deficiency, unspecified.   Surgical History: has a past surgical history that includes Colonoscopy; Rotator cuff repair (Left, 03/25/2015); Other surgical history (01/06/2015); [place] tooth extraction; Esophagogastroduodenoscopy (N/A, 2/21/2023); and Colonoscopy (N/A, 2/21/2023).   Family History: family history includes Colon cancer in an other family member; Colon cancer (age of onset: 35) in his brother; Diabetes in his mother; Heart failure in his father and mother; Stroke in his father and mother.   Social History: reports that he has been smoking cigarettes. He has a 9.00 pack-year smoking history. He has never used smokeless tobacco. He reports current alcohol use of about 10.0 standard drinks of alcohol per week. He reports that he does not use drugs.  Allergies: Naproxen       Current Outpatient Medications:     ciprofloxacin (Cipro) 500 MG tablet, Take 1 tablet by mouth 2 (Two) Times a Day., Disp: 20 tablet, Rfl: 0    METAMUCIL FIBER PO, Take  by mouth., Disp: , Rfl:     multivitamin (THERAGRAN) tablet tablet, Take  by mouth Daily., Disp: , Rfl:     polyethylene glycol (GoLYTELY) 236 g solution, Take per office instructions, Disp: 4000 mL, Rfl: 0    Psyllium (DAILY FIBER PO), Take  by mouth., Disp: , Rfl:        Physical Exam  He appears well today his abdomen is soft and  "it is nontender.  Objective     Vital Signs:   BP (!) 136/107 (BP Location: Right arm, Patient Position: Sitting, Cuff Size: Adult)   Pulse 87   Resp 16   Ht 185.4 cm (72.99\")   Wt 101 kg (222 lb)   BMI 29.30 kg/m²              Assessment and Plan    Diagnoses and all orders for this visit:    1. Diverticulitis (Primary)    At this point I think we can follow him expectantly.  I have asked him to call me back if he were to begin to have any more trouble.      "

## 2023-12-07 RX ORDER — PREDNISONE 20 MG/1
20 TABLET ORAL 2 TIMES DAILY
Qty: 10 TABLET | Refills: 1 | Status: SHIPPED | OUTPATIENT
Start: 2023-12-07 | End: 2023-12-12

## 2023-12-22 ENCOUNTER — LAB (OUTPATIENT)
Dept: LAB | Facility: HOSPITAL | Age: 38
End: 2023-12-22
Payer: COMMERCIAL

## 2023-12-22 DIAGNOSIS — Z00.00 WELL ADULT EXAM: ICD-10-CM

## 2023-12-22 DIAGNOSIS — E83.19 IRON OVERLOAD: ICD-10-CM

## 2023-12-22 DIAGNOSIS — R79.89 ELEVATED LFTS: ICD-10-CM

## 2023-12-22 DIAGNOSIS — E78.2 MIXED HYPERLIPIDEMIA: ICD-10-CM

## 2023-12-22 LAB
ALBUMIN SERPL-MCNC: 4.9 G/DL (ref 3.5–5.2)
ALBUMIN/GLOB SERPL: 1.8 G/DL
ALP SERPL-CCNC: 67 U/L (ref 39–117)
ALT SERPL W P-5'-P-CCNC: 98 U/L (ref 1–41)
ANION GAP SERPL CALCULATED.3IONS-SCNC: 14 MMOL/L (ref 5–15)
AST SERPL-CCNC: 53 U/L (ref 1–40)
BASOPHILS # BLD AUTO: 0.09 10*3/MM3 (ref 0–0.2)
BASOPHILS NFR BLD AUTO: 1.3 % (ref 0–1.5)
BILIRUB SERPL-MCNC: 0.3 MG/DL (ref 0–1.2)
BUN SERPL-MCNC: 10 MG/DL (ref 6–20)
BUN/CREAT SERPL: 11.8 (ref 7–25)
CALCIUM SPEC-SCNC: 9.7 MG/DL (ref 8.6–10.5)
CHLORIDE SERPL-SCNC: 101 MMOL/L (ref 98–107)
CHOLEST SERPL-MCNC: 223 MG/DL (ref 0–200)
CK SERPL-CCNC: 421 U/L (ref 20–200)
CO2 SERPL-SCNC: 25 MMOL/L (ref 22–29)
CREAT SERPL-MCNC: 0.85 MG/DL (ref 0.76–1.27)
DEPRECATED RDW RBC AUTO: 38.1 FL (ref 37–54)
EGFRCR SERPLBLD CKD-EPI 2021: 113.4 ML/MIN/1.73
EOSINOPHIL # BLD AUTO: 0.11 10*3/MM3 (ref 0–0.4)
EOSINOPHIL NFR BLD AUTO: 1.6 % (ref 0.3–6.2)
ERYTHROCYTE [DISTWIDTH] IN BLOOD BY AUTOMATED COUNT: 13 % (ref 12.3–15.4)
FERRITIN SERPL-MCNC: 629 NG/ML (ref 30–400)
FOLATE SERPL-MCNC: 7.95 NG/ML (ref 4.78–24.2)
GLOBULIN UR ELPH-MCNC: 2.8 GM/DL
GLUCOSE SERPL-MCNC: 119 MG/DL (ref 65–99)
HCT VFR BLD AUTO: 45.6 % (ref 37.5–51)
HDLC SERPL-MCNC: 74 MG/DL (ref 40–60)
HGB BLD-MCNC: 16.2 G/DL (ref 13–17.7)
IMM GRANULOCYTES # BLD AUTO: 0.06 10*3/MM3 (ref 0–0.05)
IMM GRANULOCYTES NFR BLD AUTO: 0.9 % (ref 0–0.5)
IRON 24H UR-MRATE: 72 MCG/DL (ref 59–158)
IRON SATN MFR SERPL: 20 % (ref 20–50)
LDLC SERPL CALC-MCNC: 132 MG/DL (ref 0–100)
LDLC/HDLC SERPL: 1.75 {RATIO}
LYMPHOCYTES # BLD AUTO: 2.37 10*3/MM3 (ref 0.7–3.1)
LYMPHOCYTES NFR BLD AUTO: 34.4 % (ref 19.6–45.3)
MCH RBC QN AUTO: 29.4 PG (ref 26.6–33)
MCHC RBC AUTO-ENTMCNC: 35.5 G/DL (ref 31.5–35.7)
MCV RBC AUTO: 82.8 FL (ref 79–97)
MONOCYTES # BLD AUTO: 0.78 10*3/MM3 (ref 0.1–0.9)
MONOCYTES NFR BLD AUTO: 11.3 % (ref 5–12)
NEUTROPHILS NFR BLD AUTO: 3.48 10*3/MM3 (ref 1.7–7)
NEUTROPHILS NFR BLD AUTO: 50.5 % (ref 42.7–76)
NRBC BLD AUTO-RTO: 0 /100 WBC (ref 0–0.2)
PLATELET # BLD AUTO: 263 10*3/MM3 (ref 140–450)
PMV BLD AUTO: 10.8 FL (ref 6–12)
POTASSIUM SERPL-SCNC: 3.7 MMOL/L (ref 3.5–5.2)
PROT SERPL-MCNC: 7.7 G/DL (ref 6–8.5)
RBC # BLD AUTO: 5.51 10*6/MM3 (ref 4.14–5.8)
SODIUM SERPL-SCNC: 140 MMOL/L (ref 136–145)
T4 FREE SERPL-MCNC: 1.15 NG/DL (ref 0.93–1.7)
TIBC SERPL-MCNC: 358 MCG/DL (ref 298–536)
TRANSFERRIN SERPL-MCNC: 240 MG/DL (ref 200–360)
TRIGL SERPL-MCNC: 99 MG/DL (ref 0–150)
TSH SERPL DL<=0.05 MIU/L-ACNC: 1.14 UIU/ML (ref 0.27–4.2)
VIT B12 BLD-MCNC: 816 PG/ML (ref 211–946)
VLDLC SERPL-MCNC: 17 MG/DL (ref 5–40)
WBC NRBC COR # BLD AUTO: 6.89 10*3/MM3 (ref 3.4–10.8)

## 2023-12-22 PROCEDURE — 82550 ASSAY OF CK (CPK): CPT

## 2023-12-22 PROCEDURE — 83540 ASSAY OF IRON: CPT

## 2023-12-22 PROCEDURE — 84443 ASSAY THYROID STIM HORMONE: CPT

## 2023-12-22 PROCEDURE — 84466 ASSAY OF TRANSFERRIN: CPT

## 2023-12-22 PROCEDURE — 82728 ASSAY OF FERRITIN: CPT

## 2023-12-22 PROCEDURE — 82746 ASSAY OF FOLIC ACID SERUM: CPT

## 2023-12-22 PROCEDURE — 80053 COMPREHEN METABOLIC PANEL: CPT

## 2023-12-22 PROCEDURE — 82607 VITAMIN B-12: CPT

## 2023-12-22 PROCEDURE — 84439 ASSAY OF FREE THYROXINE: CPT

## 2023-12-22 PROCEDURE — 80061 LIPID PANEL: CPT

## 2023-12-22 PROCEDURE — 36415 COLL VENOUS BLD VENIPUNCTURE: CPT

## 2023-12-22 PROCEDURE — 85025 COMPLETE CBC W/AUTO DIFF WBC: CPT

## 2023-12-23 NOTE — ASSESSMENT & PLAN NOTE
LDL was 143 back in 2018.  He does have family history of heart disease and stroke...LDL is up to 158 as of 11/21.  Previously his LDL was as low as 110 actually.  Patient is aware he needs to make some significant changes to his diet, will defer treatment at this time, but repeat labs in 6 months is recommended.---> Will need repeat lipids in the near future as of his 11/22 appointment.     No

## 2023-12-26 ENCOUNTER — OFFICE VISIT (OUTPATIENT)
Dept: INTERNAL MEDICINE | Facility: CLINIC | Age: 38
End: 2023-12-26
Payer: COMMERCIAL

## 2023-12-26 VITALS
BODY MASS INDEX: 29.45 KG/M2 | WEIGHT: 222.2 LBS | OXYGEN SATURATION: 98 % | TEMPERATURE: 98.2 F | SYSTOLIC BLOOD PRESSURE: 130 MMHG | DIASTOLIC BLOOD PRESSURE: 90 MMHG | HEART RATE: 94 BPM | HEIGHT: 73 IN

## 2023-12-26 DIAGNOSIS — Z00.00 ANNUAL PHYSICAL EXAM: Primary | ICD-10-CM

## 2023-12-26 DIAGNOSIS — K76.0 HEPATIC STEATOSIS: ICD-10-CM

## 2023-12-26 DIAGNOSIS — R73.01 IMPAIRED FASTING GLUCOSE: ICD-10-CM

## 2023-12-26 DIAGNOSIS — R79.89 ELEVATED LFTS: ICD-10-CM

## 2023-12-26 DIAGNOSIS — E78.2 MIXED HYPERLIPIDEMIA: ICD-10-CM

## 2023-12-26 PROBLEM — Z09 HOSPITAL DISCHARGE FOLLOW-UP: Status: RESOLVED | Noted: 2023-08-27 | Resolved: 2023-12-26

## 2023-12-26 PROCEDURE — 99395 PREV VISIT EST AGE 18-39: CPT | Performed by: INTERNAL MEDICINE

## 2023-12-26 NOTE — ASSESSMENT & PLAN NOTE
LFTs are same as of 12/23 OV.  Secondary to fatty liver, there is associated alcohol.  Repeat labs in 6 months time.

## 2023-12-26 NOTE — ASSESSMENT & PLAN NOTE
LDL of 130 is reasonable for primary prevention.  Additionally his LFTs are already a little bit, and his CK is around 400.  So no statin therapy recommended at this time

## 2023-12-26 NOTE — PROGRESS NOTES
"Chief Complaint:  Annual Exam and Follow-up (Pt states that this is routine, he had labs. )    Subjective          Jose Lyn presents to [facility]    History of present illness:  38-year-old male with underlying hyperlipidemia, mild intermittent asthma, strong family history of colon cancer, seen 7/21 as New Patient in that his last physical was over 3 years prior, who is coming in now 12/23 for routine 6-month follow-up. We will go over his med list, review any recent labs obtained, and make further recommendations at that time.    Review of Systems   Constitutional:  Negative for appetite change, fatigue and fever.   HENT:  Negative for congestion and ear pain.    Eyes:  Negative for blurred vision.   Respiratory:  Negative for cough, chest tightness, shortness of breath and wheezing.    Cardiovascular:  Negative for chest pain, palpitations and leg swelling.   Gastrointestinal:  Negative for abdominal pain.   Genitourinary:  Negative for difficulty urinating, dysuria and hematuria.   Musculoskeletal:  Negative for arthralgias and gait problem.   Skin:  Negative for skin lesions.   Neurological:  Negative for syncope, memory problem and confusion.   Psychiatric/Behavioral:  Negative for self-injury and depressed mood.        Objective   Vital Signs:   /90   Pulse 94   Temp 98.2 °F (36.8 °C) (Skin)   Ht 185.4 cm (72.99\")   Wt 101 kg (222 lb 3.2 oz)   SpO2 98%   BMI 29.32 kg/m²           Physical Exam  Vitals and nursing note reviewed.   Constitutional:       General: He is not in acute distress.     Appearance: Normal appearance. He is not toxic-appearing.   HENT:      Head: Atraumatic.      Right Ear: External ear normal.      Left Ear: External ear normal.      Nose: Nose normal.      Mouth/Throat:      Mouth: Mucous membranes are moist.   Eyes:      General:         Right eye: No discharge.         Left eye: No discharge.      Extraocular Movements: " Extraocular movements intact.      Pupils: Pupils are equal, round, and reactive to light.   Neck:      Vascular: No carotid bruit.      Comments: No carotid bruit.  Cardiovascular:      Rate and Rhythm: Normal rate and regular rhythm.      Pulses: Normal pulses.      Heart sounds: Normal heart sounds. No murmur heard.     No gallop.      Comments: Heart tones normal, no ectopy, no S3.  Pulmonary:      Effort: Pulmonary effort is normal. No respiratory distress.      Breath sounds: No wheezing, rhonchi or rales.      Comments: Lung fields clear bilaterally.  Abdominal:      General: There is no distension.      Palpations: Abdomen is soft. There is no mass.      Tenderness: There is no abdominal tenderness. There is no guarding.      Comments: No epigastric tenderness.   Musculoskeletal:         General: No swelling or tenderness.      Cervical back: No tenderness.      Right lower leg: No edema.      Left lower leg: No edema.      Comments: No edema.   Skin:     General: Skin is warm and dry.      Findings: No rash.   Neurological:      General: No focal deficit present.      Mental Status: He is alert and oriented to person, place, and time. Mental status is at baseline.      Motor: No weakness.      Gait: Gait normal.   Psychiatric:         Mood and Affect: Mood normal.         Thought Content: Thought content normal.          Result Review :   The following data was reviewed by: Tyron Jason MD on 07/23/2021:                  Assessment and Plan    Diagnoses and all orders for this visit:    1. Annual physical exam (Primary)  Overview:  Preventive measures: were reviewed with the patient at this office visit.  They included but were not limited to discussions in regards to vaccines outstanding, auto safety with seat belts and other assistive devices, fall prevention, and routine screening studies.    Exercise: Patient is a , no ischemic symptoms as of 12/23.  Comprehensive labs: Labs from 12/22/2023  were reviewed at his 12/26/2023 office visit.    Covid vaccine: J&J x2; d/w bivalent 11/22 = declined going forward.  Other vaccines: Refused flu shot 11/22 = ditto 12/23.    PSA: Not yet indicated.  Colon: 2/23 = neg = 5 yrs per Dr Mccall    SH:; son born 8/21, 7 yo son 8/21;   FH: M passed s/p CVA in 2020 age 63; B = colon CA age 38 = survived; 2S healthy, F living 12/23.    Orders:  -     TSH; Future  -     Ferritin; Future  -     Iron Profile; Future    2. Hepatic steatosis  Overview:  Liver ultrasound 1/23:  Hepatic steatosis.  Mild hepatomegaly.      3. Elevated LFTs  Overview:  Liver ultrasound 1/23:  Hepatic steatosis.  Mild hepatomegaly.  Small 3 millimeter polyp along the anterior gallbladder wall.    No shadowing stones or evidence for cholecystitis.    Acute hepatitis panel negative 11/22.    Assessment & Plan:  LFTs are same as of 12/23 OV.  Secondary to fatty liver, there is associated alcohol.  Repeat labs in 6 months time.    Orders:  -     Comprehensive Metabolic Panel; Future    4. Mixed hyperlipidemia  Assessment & Plan:  LDL of 130 is reasonable for primary prevention.  Additionally his LFTs are already a little bit, and his CK is around 400.  So no statin therapy recommended at this time    Orders:  -     Lipid Panel; Future  -     CK; Future    5. Impaired fasting glucose  Assessment & Plan:  Fasting blood sugar around 120 as of 12/23.  Negative family history of diabetes.  Will get A1c with routine labs in about 6 months.    Orders:  -     Hemoglobin A1c; Future          ANNUAL PHYSICAL 1/29/18:   = d/w all labs in detail; all good minus below; no CP/SOB at work; no new concerns;  --  LIPIDS [facility] , HDL 80's, but smoker with CAD F age 59, so continue to follow--->  and d/w diet please=get at least the 10 lbs off just put on and perhaps 20 total eventually; d/w diet drinks; f/u 6 mo now.  --  A.R. and no recent flares---> occ MDI use.  --  VIT D DEF per 7/15 labs and tx was  started...rec lower to 2x/mo now--->29 on q mo and rec 2x/mo at 1/18 OV.  --  MAJOR DEPRESSION '12 and again '14...no SI, has f/u with [facility]...resolved.  --  GERD w/o dysphagia=stable off med---> EGD '17 per Dr Mccall with ? PUD, tx'd with PPI, but stopped after f/u EGD.  HEMORRHOIDS and will julee with surgeon now=2/16--->no surgery rec.  --  FH= M/F/B/2 S...+ HTN/CVA/DM.  --  PSA  COLON julee 2/18...+polyp/FH+...per Dr Mccall <---FH+ COLON CA=40 yo Brother just dx'd ('14) and agree with screen ASAP  (; son born 8/21, 9 yo son 8/21; 2 S, B above, mom passed in 2020 with CHF/obesity.  Father living as of 11/21, has sickle cell trait, has had TIAs as well as history of CHF).    Follow Up   No follow-ups on file.  Patient was given instructions and counseling regarding his condition or for health maintenance advice. Please see specific information pulled into the AVS if appropriate.

## 2023-12-26 NOTE — ASSESSMENT & PLAN NOTE
Fasting blood sugar around 120 as of 12/23.  Negative family history of diabetes.  Will get A1c with routine labs in about 6 months.

## 2024-01-17 ENCOUNTER — TELEPHONE (OUTPATIENT)
Dept: GASTROENTEROLOGY | Facility: CLINIC | Age: 39
End: 2024-01-17
Payer: COMMERCIAL

## 2024-01-17 NOTE — TELEPHONE ENCOUNTER
Called pt to remind of overdue labs, no answer but left message for pt to complete at his earliest convenience at any  lab.   Pt has upcoming appt on 2.29.24

## 2024-02-06 ENCOUNTER — TELEPHONE (OUTPATIENT)
Dept: GASTROENTEROLOGY | Facility: CLINIC | Age: 39
End: 2024-02-06
Payer: COMMERCIAL

## 2024-02-06 NOTE — TELEPHONE ENCOUNTER
Sent pt a certified and regular  letter for canceling his colonoscopy.      Certified mail number: [phone]

## 2024-02-13 PROCEDURE — 87635 SARS-COV-2 COVID-19 AMP PRB: CPT | Performed by: NURSE PRACTITIONER

## 2024-02-26 ENCOUNTER — TELEPHONE (OUTPATIENT)
Dept: GASTROENTEROLOGY | Facility: CLINIC | Age: 39
End: 2024-02-26
Payer: COMMERCIAL

## 2024-02-26 NOTE — TELEPHONE ENCOUNTER
Called pt to see if he would like to R/S colonoscopy and upcoming OV appt on 2.29.24 as colonoscopy was not complete or labs that were ordered. Pt did not answer but left a message for pt to call office

## 2024-05-07 ENCOUNTER — TELEPHONE (OUTPATIENT)
Dept: GASTROENTEROLOGY | Facility: CLINIC | Age: 39
End: 2024-05-07
Payer: COMMERCIAL

## 2024-06-28 ENCOUNTER — PATIENT ROUNDING (BHMG ONLY) (OUTPATIENT)
Dept: URGENT CARE | Facility: CLINIC | Age: 39
End: 2024-06-28
Payer: COMMERCIAL

## 2024-06-28 NOTE — ED NOTES
Thank you for letting us care for you in your recent visit to our urgent care center. We would love to hear about your experience with us. Was this the first time you have visited our location?    We’re always looking for ways to make our patients’ experiences even better. Do you have any recommendations on ways we may improve?     I appreciate you taking the time to respond. Please be on the lookout for a survey about your recent visit from [employer] via text or email. We would greatly appreciate if you could fill that out and turn it back in. We want your voice to be heard and we value your feedback.   Thank you for choosing [facility] for your healthcare needs.

## 2025-02-12 PROCEDURE — 87637 SARSCOV2&INF A&B&RSV AMP PRB: CPT | Performed by: FAMILY MEDICINE

## 2025-03-27 ENCOUNTER — PATIENT ROUNDING (BHMG ONLY) (OUTPATIENT)
Dept: URGENT CARE | Facility: CLINIC | Age: 40
End: 2025-03-27
Payer: COMMERCIAL

## 2025-03-27 ENCOUNTER — HOSPITAL ENCOUNTER (EMERGENCY)
Facility: HOSPITAL | Age: 40
Discharge: HOME | End: 2025-03-27
Attending: EMERGENCY MEDICINE
Payer: COMMERCIAL

## 2025-03-27 VITALS
TEMPERATURE: 99 F | DIASTOLIC BLOOD PRESSURE: 104 MMHG | BODY MASS INDEX: 29.82 KG/M2 | WEIGHT: 232.37 LBS | SYSTOLIC BLOOD PRESSURE: 143 MMHG | HEART RATE: 61 BPM | OXYGEN SATURATION: 96 % | HEIGHT: 74 IN | RESPIRATION RATE: 18 BRPM

## 2025-03-27 DIAGNOSIS — I10 HYPERTENSION, UNSPECIFIED TYPE: Primary | ICD-10-CM

## 2025-03-27 LAB
ANION GAP SERPL CALCULATED.3IONS-SCNC: 10 MMOL/L (ref 5–15)
BASOPHILS # BLD AUTO: 0.07 10*3/MM3 (ref 0–0.2)
BASOPHILS NFR BLD AUTO: 1.6 % (ref 0–1.5)
BUN SERPL-MCNC: 7 MG/DL (ref 6–20)
BUN/CREAT SERPL: 7.6 (ref 7–25)
CALCIUM SPEC-SCNC: 9.3 MG/DL (ref 8.6–10.5)
CHLORIDE SERPL-SCNC: 101 MMOL/L (ref 98–107)
CO2 SERPL-SCNC: 27 MMOL/L (ref 22–29)
CREAT SERPL-MCNC: 0.92 MG/DL (ref 0.76–1.27)
DEPRECATED RDW RBC AUTO: 40.7 FL (ref 37–54)
EGFRCR SERPLBLD CKD-EPI 2021: 107.8 ML/MIN/1.73
EOSINOPHIL # BLD AUTO: 0.08 10*3/MM3 (ref 0–0.4)
EOSINOPHIL NFR BLD AUTO: 1.8 % (ref 0.3–6.2)
ERYTHROCYTE [DISTWIDTH] IN BLOOD BY AUTOMATED COUNT: 13.9 % (ref 12.3–15.4)
GLUCOSE SERPL-MCNC: 106 MG/DL (ref 65–99)
HCT VFR BLD AUTO: 44 % (ref 37.5–51)
HGB BLD-MCNC: 15.4 G/DL (ref 13–17.7)
IMM GRANULOCYTES # BLD AUTO: 0.01 10*3/MM3 (ref 0–0.05)
IMM GRANULOCYTES NFR BLD AUTO: 0.2 % (ref 0–0.5)
LYMPHOCYTES # BLD AUTO: 1.48 10*3/MM3 (ref 0.7–3.1)
LYMPHOCYTES NFR BLD AUTO: 34 % (ref 19.6–45.3)
MCH RBC QN AUTO: 28.8 PG (ref 26.6–33)
MCHC RBC AUTO-ENTMCNC: 35 G/DL (ref 31.5–35.7)
MCV RBC AUTO: 82.2 FL (ref 79–97)
MONOCYTES # BLD AUTO: 0.57 10*3/MM3 (ref 0.1–0.9)
MONOCYTES NFR BLD AUTO: 13.1 % (ref 5–12)
NEUTROPHILS NFR BLD AUTO: 2.14 10*3/MM3 (ref 1.7–7)
NEUTROPHILS NFR BLD AUTO: 49.3 % (ref 42.7–76)
NRBC BLD AUTO-RTO: 0 /100 WBC (ref 0–0.2)
PLATELET # BLD AUTO: 180 10*3/MM3 (ref 140–450)
PMV BLD AUTO: 10 FL (ref 6–12)
POTASSIUM SERPL-SCNC: 3.9 MMOL/L (ref 3.5–5.2)
RBC # BLD AUTO: 5.35 10*6/MM3 (ref 4.14–5.8)
SODIUM SERPL-SCNC: 138 MMOL/L (ref 136–145)
WBC NRBC COR # BLD AUTO: 4.35 10*3/MM3 (ref 3.4–10.8)

## 2025-03-27 PROCEDURE — 80048 BASIC METABOLIC PNL TOTAL CA: CPT | Performed by: EMERGENCY MEDICINE

## 2025-03-27 PROCEDURE — 99283 EMERGENCY DEPT VISIT LOW MDM: CPT

## 2025-03-27 PROCEDURE — 85025 COMPLETE CBC W/AUTO DIFF WBC: CPT | Performed by: EMERGENCY MEDICINE

## 2025-03-27 RX ORDER — LISINOPRIL 10 MG/1
20 TABLET ORAL ONCE
Status: COMPLETED | OUTPATIENT
Start: 2025-03-27 | End: 2025-03-27

## 2025-03-27 RX ADMIN — LISINOPRIL 20 MG: 10 TABLET ORAL at 13:23
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
## 2025-03-27 NOTE — ED NOTES
Thank you for letting us care for you in your recent visit to our urgent care center. We would love to hear about your experience with us. Was this the first time you have visited our location?    We’re always looking for ways to make our patients’ experiences even better. Do you have any recommendations on ways we may improve?     I appreciate you taking the time to respond. Please be on the lookout for a survey about your recent visit from [employer] via text or email. We would greatly appreciate if you could fill that out and turn it back in. We want your voice to be heard and we value your feedback.   Thank you for choosing [facility] for your healthcare needs.

## 2025-03-27 NOTE — Clinical Note
[facility] EMERGENCY ROOM  [address] [place] [address]  Phone: [phone]  Fax: [phone]    Jose Lyn was seen and treated in our emergency department on 3/27/2025.  He may return to work on 03/31/2025.         Thank you for choosing [facility].    Roverto Tapia MD

## 2025-03-31 ENCOUNTER — OFFICE VISIT (OUTPATIENT)
Age: 40
End: 2025-03-31
Payer: COMMERCIAL

## 2025-03-31 VITALS
HEIGHT: 74 IN | OXYGEN SATURATION: 98 % | DIASTOLIC BLOOD PRESSURE: 110 MMHG | SYSTOLIC BLOOD PRESSURE: 170 MMHG | HEART RATE: 88 BPM | WEIGHT: 233.4 LBS | BODY MASS INDEX: 29.95 KG/M2

## 2025-03-31 DIAGNOSIS — R73.01 IMPAIRED FASTING GLUCOSE: ICD-10-CM

## 2025-03-31 DIAGNOSIS — I10 PRIMARY HYPERTENSION: ICD-10-CM

## 2025-03-31 DIAGNOSIS — Z00.00 ANNUAL PHYSICAL EXAM: Primary | ICD-10-CM

## 2025-03-31 DIAGNOSIS — E78.2 MIXED HYPERLIPIDEMIA: ICD-10-CM

## 2025-03-31 RX ORDER — AMLODIPINE BESYLATE 2.5 MG/1
2.5 TABLET ORAL DAILY
Qty: 90 TABLET | Refills: 1 | Status: SHIPPED | OUTPATIENT
Start: 2025-03-31

## 2025-03-31 NOTE — PROGRESS NOTES
"Chief Complaint:  ER follow up (Pt states that his BP was elevated and went to ER, It has been around 146-147/107-108, this morning it was 162/111. /He feels that he had to much on his plate at one time. )    Subjective          Jose Lyn presents to [facility]    History of present illness:  39-year-old male with underlying hyperlipidemia, mild intermittent asthma, strong family history of colon cancer, seen 7/21 as New Patient in that his last physical was over 3 years prior, seen next in 12/23, and now in 3/25 for ER follow-up/annual exam.  We will go over his med list, review any recent labs obtained, and make further recommendations at that time. 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   HENT:  Negative for congestion and ear pain.    Eyes:  Negative for blurred vision.   Respiratory:  Negative for cough, chest tightness, shortness of breath and wheezing.    Cardiovascular:  Negative for chest pain, palpitations and leg swelling.   Gastrointestinal:  Negative for abdominal pain.   Genitourinary:  Negative for difficulty urinating, dysuria and hematuria.   Musculoskeletal:  Negative for arthralgias and gait problem.   Skin:  Negative for skin lesions.   Neurological:  Negative for syncope, memory problem and confusion.   Psychiatric/Behavioral:  Negative for self-injury and depressed mood.        Objective   Vital Signs:   BP (!) 170/110   Pulse 88   Ht 188 cm (74.02\")   Wt 106 kg (233 lb 6.4 oz)   SpO2 98%   BMI 29.95 kg/m²           Physical Exam  Vitals and nursing note reviewed.   Constitutional:       General: He is not in acute distress.     Appearance: Normal appearance. He is not toxic-appearing.   HENT:      Head: Atraumatic.      Right Ear: External ear normal.      Left Ear: External ear normal.      Nose: Nose normal.      Mouth/Throat:      Mouth: " Mucous membranes are moist.   Eyes:      General:         Right eye: No discharge.         Left eye: No discharge.      Extraocular Movements: Extraocular movements intact.      Pupils: Pupils are equal, round, and reactive to light.   Neck:      Vascular: No carotid bruit.      Comments: No carotid bruit.  Cardiovascular:      Rate and Rhythm: Normal rate and regular rhythm.      Pulses: Normal pulses.      Heart sounds: Normal heart sounds. No murmur heard.     No gallop.      Comments: Heart tones normal, no ectopy, no S3.  Pulmonary:      Effort: Pulmonary effort is normal. No respiratory distress.      Breath sounds: No wheezing, rhonchi or rales.      Comments: Lung fields clear bilaterally.  Abdominal:      General: There is no distension.      Palpations: Abdomen is soft. There is no mass.      Tenderness: There is no abdominal tenderness. There is no guarding.      Comments: No epigastric tenderness.   Musculoskeletal:         General: No swelling or tenderness.      Cervical back: No tenderness.      Right lower leg: No edema.      Left lower leg: No edema.      Comments: No edema.   Skin:     General: Skin is warm and dry.      Findings: No rash.   Neurological:      General: No focal deficit present.      Mental Status: He is alert and oriented to person, place, and time. Mental status is at baseline.      Motor: No weakness.      Gait: Gait normal.   Psychiatric:         Mood and Affect: Mood normal.         Thought Content: Thought content normal.          Result Review :   The following data was reviewed by: Tyron Jason MD on 07/23/2021:                  Assessment and Plan    Diagnoses and all orders for this visit:    1. Annual physical exam (Primary)  Overview:  Preventive measures: were reviewed with the patient at this office visit.  They included but were not limited to discussions in regards to vaccines outstanding, auto safety with seat belts and other assistive devices, fall prevention, and  routine screening studies.    Exercise: Patient is a , no ischemic symptoms as of 3/25.  Comprehensive labs: Labs from ER visit in 3/25 were reviewed.    Covid vaccine: J&J x2; d/w bivalent 11/22 = declined going forward.  Other vaccines: Typically does okay without flu shots.    PSA: Not yet indicated.  Colon: 2/23 = neg = 5 yrs per Dr Mccall    SH:; son born 8/21, 7 yo son 8/21;   FH: M DM2 passed s/p CVA in 2020 age 62; B = colon CA age 38 = survived; 2S healthy, F TIA living 12/23---> no changes as of 3/25 OV..    Orders:  -     TSH+Free T4; Future    2. Mixed hyperlipidemia  Assessment & Plan:   Past due for routine annual screening labs as of 3/25.  Orders placed.    Orders:  -     Lipid Panel; Future  -     CK; Future    3. Impaired fasting glucose  Assessment & Plan:  Not fast sugar 106 in the ER recently, or getting an A1c as of his 3/25 OV.    Orders:  -     Hemoglobin A1c; Future    4. Primary hypertension  Assessment & Plan:  Patient lost to follow-up for past 18 months or so, intermittently lost to follow-up over the years.    Looking back his diastolic blood pressure has been over 100 at numerous urgent care visits.    Patient feels that stress is contributing a lot to his hypertension presently, but discussed with him that typically not something that would cause diastolics to be in this range, has a family history of hypertension, so recommend at least low-dose treatment at this time, titrate in 3 weeks or so based on response.    Checking routine labs as well in regards to urinalysis, thyroid function etc.    His BMP and CBC were negative in the ER just last week    Orders:  -     Comprehensive Metabolic Panel; Future  -     Urinalysis With Culture If Indicated -; Future    Other orders  -     amLODIPine (NORVASC) 2.5 MG tablet; Take 1 tablet by mouth Daily.  Dispense: 90 tablet; Refill: 1            ANNUAL PHYSICAL 1/29/18:   = d/w all labs in detail; all good minus below; no CP/SOB  at work; no new concerns;  --  LIPIDS [facility] , HDL 80's, but smoker with CAD F age 59, so continue to follow--->  and d/w diet please=get at least the 10 lbs off just put on and perhaps 20 total eventually; d/w diet drinks; f/u 6 mo now.  --  A.R. and no recent flares---> occ MDI use.  --  VIT D DEF per 7/15 labs and tx was started...rec lower to 2x/mo now--->29 on q mo and rec 2x/mo at 1/18 OV.  --  MAJOR DEPRESSION '12 and again '14...no SI, has f/u with [facility]...resolved.  --  GERD w/o dysphagia=stable off med---> EGD '17 per Dr Mccall with ? PUD, tx'd with PPI, but stopped after f/u EGD.  HEMORRHOIDS and will julee with surgeon now=2/16--->no surgery rec.  --    Follow Up   Return in about 6 weeks (around 5/12/2025).  Patient was given instructions and counseling regarding his condition or for health maintenance advice. Please see specific information pulled into the AVS if appropriate.

## 2025-03-31 NOTE — ASSESSMENT & PLAN NOTE
Patient lost to follow-up for past 18 months or so, intermittently lost to follow-up over the years.    Looking back his diastolic blood pressure has been over 100 at numerous urgent care visits.    Patient feels that stress is contributing a lot to his hypertension presently, but discussed with him that typically not something that would cause diastolics to be in this range, has a family history of hypertension, so recommend at least low-dose treatment at this time, titrate in 3 weeks or so based on response.    Checking routine labs as well in regards to urinalysis, thyroid function etc.    His BMP and CBC were negative in the ER just last week

## 2025-04-08 ENCOUNTER — TELEPHONE (OUTPATIENT)
Age: 40
End: 2025-04-08
Payer: COMMERCIAL

## 2025-04-08 NOTE — TELEPHONE ENCOUNTER
Patient should increase his amlodipine to 5 mg daily, tell him to take 2 of the 2.5 mg tablets at the same time.  Ask him to to continue to keep an eye on his blood pressure and let us know what is averaging in about 2 weeks.

## 2025-04-08 NOTE — TELEPHONE ENCOUNTER
Pt called in and states he needs a work note for 04/072025 and 04/08/2025 his blood pressure has been running 146/108 just seen on 03/31/2025 and did not get med till this past Friday. Is this ok or should he come in?

## 2025-04-25 ENCOUNTER — LAB (OUTPATIENT)
Dept: LAB | Facility: HOSPITAL | Age: 40
End: 2025-04-25
Payer: COMMERCIAL

## 2025-04-25 DIAGNOSIS — R73.01 IMPAIRED FASTING GLUCOSE: ICD-10-CM

## 2025-04-25 DIAGNOSIS — Z00.00 ANNUAL PHYSICAL EXAM: ICD-10-CM

## 2025-04-25 DIAGNOSIS — E78.2 MIXED HYPERLIPIDEMIA: ICD-10-CM

## 2025-04-25 DIAGNOSIS — I10 PRIMARY HYPERTENSION: ICD-10-CM

## 2025-04-25 LAB
ALBUMIN SERPL-MCNC: 4.6 G/DL (ref 3.5–5.2)
ALBUMIN/GLOB SERPL: 1.4 G/DL
ALP SERPL-CCNC: 83 U/L (ref 39–117)
ALT SERPL W P-5'-P-CCNC: 121 U/L (ref 1–41)
ANION GAP SERPL CALCULATED.3IONS-SCNC: 16.6 MMOL/L (ref 5–15)
AST SERPL-CCNC: 62 U/L (ref 1–40)
BILIRUB SERPL-MCNC: 0.2 MG/DL (ref 0–1.2)
BILIRUB UR QL STRIP: NEGATIVE
BUN SERPL-MCNC: 8 MG/DL (ref 6–20)
BUN/CREAT SERPL: 9 (ref 7–25)
CALCIUM SPEC-SCNC: 9.7 MG/DL (ref 8.6–10.5)
CHLORIDE SERPL-SCNC: 105 MMOL/L (ref 98–107)
CHOLEST SERPL-MCNC: 273 MG/DL (ref 0–200)
CK SERPL-CCNC: 581 U/L (ref 20–200)
CLARITY UR: CLEAR
CO2 SERPL-SCNC: 22.4 MMOL/L (ref 22–29)
COLOR UR: YELLOW
CREAT SERPL-MCNC: 0.89 MG/DL (ref 0.76–1.27)
EGFRCR SERPLBLD CKD-EPI 2021: 111.1 ML/MIN/1.73
GLOBULIN UR ELPH-MCNC: 3.2 GM/DL
GLUCOSE SERPL-MCNC: 112 MG/DL (ref 65–99)
GLUCOSE UR STRIP-MCNC: NEGATIVE MG/DL
HBA1C MFR BLD: 6.1 % (ref 4.8–5.6)
HDLC SERPL-MCNC: 60 MG/DL (ref 40–60)
HGB UR QL STRIP.AUTO: NEGATIVE
HOLD SPECIMEN: NORMAL
KETONES UR QL STRIP: NEGATIVE
LDLC SERPL CALC-MCNC: 179 MG/DL (ref 0–100)
LDLC/HDLC SERPL: 2.94 {RATIO}
LEUKOCYTE ESTERASE UR QL STRIP.AUTO: NEGATIVE
NITRITE UR QL STRIP: NEGATIVE
PH UR STRIP.AUTO: 6 [PH] (ref 5–8)
POTASSIUM SERPL-SCNC: 4.2 MMOL/L (ref 3.5–5.2)
PROT SERPL-MCNC: 7.8 G/DL (ref 6–8.5)
PROT UR QL STRIP: ABNORMAL
SODIUM SERPL-SCNC: 144 MMOL/L (ref 136–145)
SP GR UR STRIP: 1.02 (ref 1–1.03)
T4 FREE SERPL-MCNC: 0.86 NG/DL (ref 0.92–1.68)
TRIGL SERPL-MCNC: 184 MG/DL (ref 0–150)
TSH SERPL DL<=0.05 MIU/L-ACNC: 1.85 UIU/ML (ref 0.27–4.2)
UROBILINOGEN UR QL STRIP: ABNORMAL
VLDLC SERPL-MCNC: 34 MG/DL (ref 5–40)

## 2025-04-25 PROCEDURE — 80053 COMPREHEN METABOLIC PANEL: CPT

## 2025-04-25 PROCEDURE — 81003 URINALYSIS AUTO W/O SCOPE: CPT

## 2025-04-25 PROCEDURE — 82550 ASSAY OF CK (CPK): CPT

## 2025-04-25 PROCEDURE — 80061 LIPID PANEL: CPT

## 2025-04-25 PROCEDURE — 84439 ASSAY OF FREE THYROXINE: CPT

## 2025-04-25 PROCEDURE — 83036 HEMOGLOBIN GLYCOSYLATED A1C: CPT

## 2025-04-25 PROCEDURE — 84443 ASSAY THYROID STIM HORMONE: CPT

## 2025-04-25 PROCEDURE — 36415 COLL VENOUS BLD VENIPUNCTURE: CPT

## 2025-04-29 ENCOUNTER — TELEPHONE (OUTPATIENT)
Age: 40
End: 2025-04-29
Payer: COMMERCIAL

## 2025-04-29 DIAGNOSIS — K76.0 HEPATIC STEATOSIS: Primary | ICD-10-CM

## 2025-04-29 DIAGNOSIS — R79.89 ELEVATED LFTS: ICD-10-CM

## 2025-04-29 RX ORDER — VALSARTAN 80 MG/1
80 TABLET ORAL DAILY
Qty: 30 TABLET | Refills: 0 | Status: SHIPPED | OUTPATIENT
Start: 2025-04-29

## 2025-04-29 NOTE — TELEPHONE ENCOUNTER
Pt states that his blood pressure is not under control and his last day of work was  4/21/2024, can you sign FMLA paperwork keeping him off until we get this under control.       Also he is wanting lab results and to know the next step.   Please advise.

## 2025-04-29 NOTE — TELEPHONE ENCOUNTER
Caller: Jose Lyn    Relationship: Self    Best call back number: [phone]     What was the call regarding: PATIENT STATES HE DROPPED OFF [employer] WORK LAST WEEK AND WANTED TO KNOW IF IT WAS COMPLETED YET OR NOT. PATIENT STATES HE WOULD ALSO LIKE FOR SOMEONE TO CALL HIM TO GO OVER HIS LAB RESULTS FROM HIS BLOOD/URINE LABS.

## 2025-04-29 NOTE — TELEPHONE ENCOUNTER
1.  Regards to his blood pressure I sent over valsartan 80 mg, he will take this in addition to the 5 mg of the amlodipine.  He has 2.5 mg amlodipine that he supposed to be taking to have at the same time once a day.  If he is going to run out of these prescription before his appointment, he needs to call for refill, and we will call in the 5 mg dose at that time.    2.  His fatty liver is worse, his liver enzymes are the highest they have ever been, he needs to abstain from alcohol and get back in with the gastroenterologist, consult placed.    3.  His cholesterol is concerningly elevated, he needs to be on cholesterol medication, but he need to get his liver enzymes down first.    4.  He is a borderline diabetic.    5.  Keep appointment.    6.  Okay with [facility] for the time being.

## 2025-05-12 ENCOUNTER — OFFICE VISIT (OUTPATIENT)
Age: 40
End: 2025-05-12
Payer: COMMERCIAL

## 2025-05-12 VITALS
TEMPERATURE: 98.2 F | SYSTOLIC BLOOD PRESSURE: 138 MMHG | WEIGHT: 236.1 LBS | DIASTOLIC BLOOD PRESSURE: 72 MMHG | HEART RATE: 85 BPM | HEIGHT: 74 IN | BODY MASS INDEX: 30.3 KG/M2 | OXYGEN SATURATION: 98 %

## 2025-05-12 DIAGNOSIS — R79.89 ELEVATED LFTS: ICD-10-CM

## 2025-05-12 DIAGNOSIS — R73.01 IMPAIRED FASTING GLUCOSE: ICD-10-CM

## 2025-05-12 DIAGNOSIS — I10 PRIMARY HYPERTENSION: Primary | ICD-10-CM

## 2025-05-12 DIAGNOSIS — E78.2 MIXED HYPERLIPIDEMIA: ICD-10-CM

## 2025-05-12 PROCEDURE — 99214 OFFICE O/P EST MOD 30 MIN: CPT | Performed by: INTERNAL MEDICINE

## 2025-05-12 RX ORDER — AMLODIPINE BESYLATE 5 MG/1
5 TABLET ORAL DAILY
Qty: 90 TABLET | Refills: 1 | Status: SHIPPED | OUTPATIENT
Start: 2025-05-12

## 2025-05-12 RX ORDER — ATORVASTATIN CALCIUM 20 MG/1
20 TABLET, FILM COATED ORAL DAILY
Qty: 90 TABLET | Refills: 1 | Status: SHIPPED | OUTPATIENT
Start: 2025-05-12

## 2025-05-12 RX ORDER — VALSARTAN 80 MG/1
80 TABLET ORAL DAILY
Qty: 90 TABLET | Refills: 1 | Status: SHIPPED | OUTPATIENT
Start: 2025-05-12

## 2025-05-12 NOTE — ASSESSMENT & PLAN NOTE
Patient's LDL is right about 180 as of his 5/25 OV.  He has underlying borderline diabetes that we are just diagnosing, he smokes, and has hypertension.    Discussed with patient since his blood pressure is controlled now, we need to work at getting his cholesterol down, we will start him on low-dose atorvastatin.

## 2025-05-12 NOTE — PROGRESS NOTES
"Chief Complaint:  Primary Care Follow-Up (Pt states things are getting better. Pt declines questions/ concerns. )    Subjective          Jose Lyn presents to [facility]    History of present illness:  39-year-old male with underlying hyperlipidemia, mild intermittent asthma, strong family history of colon cancer, seen 7/21 as New Patient in that his last physical was over 3 years prior, seen next in 12/23, and in 3/25 for ER follow-up/annual exam.  He is being seen now in 5/25 for closer follow-up of blood pressure.  We will go over his med list, review any recent labs obtained, and make further recommendations at that time. 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   HENT:  Negative for congestion and ear pain.    Eyes:  Negative for blurred vision.   Respiratory:  Negative for cough, chest tightness, shortness of breath and wheezing.    Cardiovascular:  Negative for chest pain, palpitations and leg swelling.   Gastrointestinal:  Negative for abdominal pain.   Genitourinary:  Negative for difficulty urinating, dysuria and hematuria.   Musculoskeletal:  Negative for arthralgias and gait problem.   Skin:  Negative for skin lesions.   Neurological:  Negative for syncope, memory problem and confusion.   Psychiatric/Behavioral:  Negative for self-injury and depressed mood.        Objective   Vital Signs:   /72 (BP Location: Right arm, Patient Position: Sitting, Cuff Size: Adult)   Pulse 85   Temp 98.2 °F (36.8 °C) (Oral)   Ht 188 cm (74.02\")   Wt 107 kg (236 lb 1.6 oz)   SpO2 98%   BMI 30.30 kg/m²           Physical Exam  Vitals and nursing note reviewed.   Constitutional:       General: He is not in acute distress.     Appearance: Normal appearance. He is not toxic-appearing.   HENT:      Head: Atraumatic.      Right Ear: External ear normal.      Left Ear: " External ear normal.      Nose: Nose normal.      Mouth/Throat:      Mouth: Mucous membranes are moist.   Eyes:      General:         Right eye: No discharge.         Left eye: No discharge.      Extraocular Movements: Extraocular movements intact.      Pupils: Pupils are equal, round, and reactive to light.   Neck:      Vascular: No carotid bruit.      Comments: No carotid bruit.  Cardiovascular:      Rate and Rhythm: Normal rate and regular rhythm.      Pulses: Normal pulses.      Heart sounds: Normal heart sounds. No murmur heard.     No gallop.      Comments: Heart tones normal, no ectopy, no S3.  Pulmonary:      Effort: Pulmonary effort is normal. No respiratory distress.      Breath sounds: No wheezing, rhonchi or rales.      Comments: Lung fields clear bilaterally.  Abdominal:      General: There is no distension.      Palpations: Abdomen is soft. There is no mass.      Tenderness: There is no abdominal tenderness. There is no guarding.      Comments: No epigastric tenderness.   Musculoskeletal:         General: No swelling or tenderness.      Cervical back: No tenderness.      Right lower leg: No edema.      Left lower leg: No edema.      Comments: No edema.   Skin:     General: Skin is warm and dry.      Findings: No rash.   Neurological:      General: No focal deficit present.      Mental Status: He is alert and oriented to person, place, and time. Mental status is at baseline.      Motor: No weakness.      Gait: Gait normal.   Psychiatric:         Mood and Affect: Mood normal.         Thought Content: Thought content normal.          Result Review :   The following data was reviewed by: Tyron Jason MD on 07/23/2021:                  Assessment and Plan    Diagnoses and all orders for this visit:    1. Primary hypertension (Primary)  Assessment & Plan:  Phone message 4/29/2025:  1.  Regards to his blood pressure I sent over valsartan 80 mg, he will take this in addition to the 5 mg of the amlodipine.  He  has 2.5 mg amlodipine that he supposed to be taking to have at the same time once a day.  If he is going to run out of these prescription before his appointment, he needs to call for refill, and we will call in the 5 mg dose at that time.    2.  His fatty liver is worse, his liver enzymes are the highest they have ever been, he needs to abstain from alcohol and get back in with the gastroenterologist, consult placed.    3.  His cholesterol is concerningly elevated, he needs to be on cholesterol medication, but he need to get his liver enzymes down first.    4.  He is a borderline diabetic.    5.  Keep appointment.    6.  Okay with [facility] for the time being.      ---> As noted above, patient was started on low-dose amlodipine, we titrated to moderate dose and then most recently added low-dose valsartan.  As of his 5/25 follow-up, his blood pressure is with excellent control, hopefully will remain stable on this regimen going forward.        Of note, there is some possible ED secondary to the ARB, so we may have to make some changes down the road.      2. Mixed hyperlipidemia  Assessment & Plan:   Patient's LDL is right about 180 as of his 5/25 OV.  He has underlying borderline diabetes that we are just diagnosing, he smokes, and has hypertension.    Discussed with patient since his blood pressure is controlled now, we need to work at getting his cholesterol down, we will start him on low-dose atorvastatin.    Orders:  -     Lipid Panel; Future  -     CK; Future    3. Impaired fasting glucose  Overview:  His mom had type 2 diabetes.    Assessment & Plan:  Patient is fasting blood sugar is 112 and his A1c just came back at 6.1.  We are reviewing this at his 5/25 OV.  This is actually the first time we have checked this, as noted above his mom did have diabetes.    Recommend patient watch the simple sugars in his diet, he already backed off on soft drinks, but it sounds like he is going to have to watch the juices and  sweet tea as well.    Commend repeat lab in 3 to 4 months.        Orders:  -     Hemoglobin A1c; Future    4. Elevated LFTs  Overview:  Liver ultrasound 1/23:  Hepatic steatosis.  Mild hepatomegaly.  Small 3 millimeter polyp along the anterior gallbladder wall.    No shadowing stones or evidence for cholecystitis.    Acute hepatitis panel negative 11/22.    Assessment & Plan:  Patient's ALT was up to 120, this is highest it has been previously.  He typically ranged in the 60 ballpark.  He had the liver ultrasound just about 2 years ago, I think were okay on that for now, patient is backing off on the alcohol, that is good to hear.    Orders:  -     Comprehensive Metabolic Panel; Future    Other orders  -     amLODIPine (NORVASC) 5 MG tablet; Take 1 tablet by mouth Daily.  Dispense: 90 tablet; Refill: 1  -     valsartan (Diovan) 80 MG tablet; Take 1 tablet by mouth Daily.  Dispense: 90 tablet; Refill: 1  -     atorvastatin (LIPITOR) 20 MG tablet; Take 1 tablet by mouth Daily.  Dispense: 90 tablet; Refill: 1              ANNUAL PHYSICAL 1/29/18:   = d/w all labs in detail; all good minus below; no CP/SOB at work; no new concerns;  --  LIPIDS [facility] , HDL 80's, but smoker with CAD F age 59, so continue to follow--->  and d/w diet please=get at least the 10 lbs off just put on and perhaps 20 total eventually; d/w diet drinks; f/u 6 mo now.  --  A.R. and no recent flares---> occ MDI use.  --  VIT D DEF per 7/15 labs and tx was started...rec lower to 2x/mo now--->29 on q mo and rec 2x/mo at 1/18 OV.  --  MAJOR DEPRESSION '12 and again '14...no SI, has f/u with [facility]...resolved.  --  GERD w/o dysphagia=stable off med---> EGD '17 per Dr Mccall with ? PUD, tx'd with PPI, but stopped after f/u EGD.  HEMORRHOIDS and will julee with surgeon now=2/16--->no surgery rec.  --    Follow Up   Return in about 3 months (around 8/12/2025).  Patient was given instructions and counseling regarding his condition or for  health maintenance advice. Please see specific information pulled into the AVS if appropriate.

## 2025-05-12 NOTE — ASSESSMENT & PLAN NOTE
Patient is fasting blood sugar is 112 and his A1c just came back at 6.1.  We are reviewing this at his 5/25 OV.  This is actually the first time we have checked this, as noted above his mom did have diabetes.    Recommend patient watch the simple sugars in his diet, he already backed off on soft drinks, but it sounds like he is going to have to watch the juices and sweet tea as well.    Commend repeat lab in 3 to 4 months.

## 2025-05-12 NOTE — ASSESSMENT & PLAN NOTE
Phone message 4/29/2025:  1.  Regards to his blood pressure I sent over valsartan 80 mg, he will take this in addition to the 5 mg of the amlodipine.  He has 2.5 mg amlodipine that he supposed to be taking to have at the same time once a day.  If he is going to run out of these prescription before his appointment, he needs to call for refill, and we will call in the 5 mg dose at that time.    2.  His fatty liver is worse, his liver enzymes are the highest they have ever been, he needs to abstain from alcohol and get back in with the gastroenterologist, consult placed.    3.  His cholesterol is concerningly elevated, he needs to be on cholesterol medication, but he need to get his liver enzymes down first.    4.  He is a borderline diabetic.    5.  Keep appointment.    6.  Okay with FMLA for the time being.      ---> As noted above, patient was started on low-dose amlodipine, we titrated to moderate dose and then most recently added low-dose valsartan.  As of his 5/25 follow-up, his blood pressure is with excellent control, hopefully will remain stable on this regimen going forward.        Of note, there is some possible ED secondary to the ARB, so we may have to make some changes down the road.

## 2025-05-12 NOTE — ASSESSMENT & PLAN NOTE
Patient's ALT was up to 120, this is highest it has been previously.  He typically ranged in the 60 ballpark.  He had the liver ultrasound just about 2 years ago, I think were okay on that for now, patient is backing off on the alcohol, that is good to hear.

## 2025-07-11 ENCOUNTER — TELEPHONE (OUTPATIENT)
Age: 40
End: 2025-07-11
Payer: COMMERCIAL

## 2025-07-11 NOTE — TELEPHONE ENCOUNTER
Caller: Jose Lyn    Relationship: Self    Best call back number: [phone]    What form or medical record are you requesting: INTERMITTENT FMLA PAPERWORK FOR DAYS THAT PATIENT DOES NOT FEEL GOOD     Who is requesting this form or medical record from you: EMPLOYER    Timeframe paperwork needed: AS SOON AS POSSIBLE     Additional notes: PATIENT WAS ON FMLA AND RETURNED TO WORK IN MAY, 2025. HE HAS DAYS WHERE HE DOES NOT FEEL GOOD STILL, AND WOULD LIKE TO GET FMLA STARTED FOR DAYS THAT HE DOES NOT FEEL GOOD AND HAS TO LEAVE WORK OR CALL IN TO WORK. PATIENT WOULD LIKE CALL TO LET HIM KNOW IF THIS WOULD BE POSSIBLE.

## 2025-07-14 NOTE — TELEPHONE ENCOUNTER
1.  Patient may need to be off 2 days up to every 2 weeks for uncontrolled blood pressure.    2.  4-month timeframe for FMLA.    3.  He was just seen in urgent care today regarding blood pressure etc.  Looks like we need to titrate medication again, but there is some confusion of to what dose of amlodipine he is taking.  He supposed to be on 5 mg, but the current dose in the chart says 2.5 mg from July 3?    4.  So if he is on 5 mg as per our last OV, please send a prescription over for 10 mg once a day.    5.  If he is on 2.5 mg daily, please let me know how this happened.    6.  Thanks.

## 2025-07-15 ENCOUNTER — HOSPITAL ENCOUNTER (EMERGENCY)
Facility: HOSPITAL | Age: 40
Discharge: HOME | End: 2025-07-15
Attending: EMERGENCY MEDICINE | Admitting: EMERGENCY MEDICINE
Payer: COMMERCIAL

## 2025-07-15 ENCOUNTER — APPOINTMENT (OUTPATIENT)
Dept: GENERAL RADIOLOGY | Facility: HOSPITAL | Age: 40
End: 2025-07-15
Payer: COMMERCIAL

## 2025-07-15 VITALS
SYSTOLIC BLOOD PRESSURE: 128 MMHG | HEIGHT: 74 IN | RESPIRATION RATE: 16 BRPM | HEART RATE: 68 BPM | BODY MASS INDEX: 29.31 KG/M2 | DIASTOLIC BLOOD PRESSURE: 86 MMHG | WEIGHT: 228.4 LBS | OXYGEN SATURATION: 97 % | TEMPERATURE: 98.4 F

## 2025-07-15 DIAGNOSIS — I15.9 SECONDARY HYPERTENSION: Primary | ICD-10-CM

## 2025-07-15 LAB
ALBUMIN SERPL-MCNC: 5 G/DL (ref 3.5–5.2)
ALBUMIN/GLOB SERPL: 1.8 G/DL
ALP SERPL-CCNC: 81 U/L (ref 39–117)
ALT SERPL W P-5'-P-CCNC: 52 U/L (ref 1–41)
ANION GAP SERPL CALCULATED.3IONS-SCNC: 13.1 MMOL/L (ref 5–15)
AST SERPL-CCNC: 38 U/L (ref 1–40)
BASOPHILS # BLD AUTO: 0.06 10*3/MM3 (ref 0–0.2)
BASOPHILS NFR BLD AUTO: 1 % (ref 0–1.5)
BILIRUB SERPL-MCNC: 0.6 MG/DL (ref 0–1.2)
BUN SERPL-MCNC: 7.2 MG/DL (ref 6–20)
BUN/CREAT SERPL: 9 (ref 7–25)
CALCIUM SPEC-SCNC: 10 MG/DL (ref 8.6–10.5)
CHLORIDE SERPL-SCNC: 102 MMOL/L (ref 98–107)
CO2 SERPL-SCNC: 22.9 MMOL/L (ref 22–29)
CREAT SERPL-MCNC: 0.8 MG/DL (ref 0.76–1.27)
DEPRECATED RDW RBC AUTO: 37 FL (ref 37–54)
EGFRCR SERPLBLD CKD-EPI 2021: 114.7 ML/MIN/1.73
EOSINOPHIL # BLD AUTO: 0.3 10*3/MM3 (ref 0–0.4)
EOSINOPHIL NFR BLD AUTO: 5 % (ref 0.3–6.2)
ERYTHROCYTE [DISTWIDTH] IN BLOOD BY AUTOMATED COUNT: 12.9 % (ref 12.3–15.4)
GEN 5 1HR TROPONIN T REFLEX: 10 NG/L
GLOBULIN UR ELPH-MCNC: 2.8 GM/DL
GLUCOSE BLDC GLUCOMTR-MCNC: 146 MG/DL (ref 70–99)
GLUCOSE SERPL-MCNC: 123 MG/DL (ref 65–99)
HCT VFR BLD AUTO: 43 % (ref 37.5–51)
HGB BLD-MCNC: 15.7 G/DL (ref 13–17.7)
HOLD SPECIMEN: NORMAL
HOLD SPECIMEN: NORMAL
IMM GRANULOCYTES # BLD AUTO: 0.02 10*3/MM3 (ref 0–0.05)
IMM GRANULOCYTES NFR BLD AUTO: 0.3 % (ref 0–0.5)
LYMPHOCYTES # BLD AUTO: 2.36 10*3/MM3 (ref 0.7–3.1)
LYMPHOCYTES NFR BLD AUTO: 39.3 % (ref 19.6–45.3)
MAGNESIUM SERPL-MCNC: 1.7 MG/DL (ref 1.6–2.6)
MCH RBC QN AUTO: 29.3 PG (ref 26.6–33)
MCHC RBC AUTO-ENTMCNC: 36.5 G/DL (ref 31.5–35.7)
MCV RBC AUTO: 80.4 FL (ref 79–97)
MONOCYTES # BLD AUTO: 0.78 10*3/MM3 (ref 0.1–0.9)
MONOCYTES NFR BLD AUTO: 13 % (ref 5–12)
NEUTROPHILS NFR BLD AUTO: 2.48 10*3/MM3 (ref 1.7–7)
NEUTROPHILS NFR BLD AUTO: 41.4 % (ref 42.7–76)
NRBC BLD AUTO-RTO: 0 /100 WBC (ref 0–0.2)
PLATELET # BLD AUTO: 220 10*3/MM3 (ref 140–450)
PMV BLD AUTO: 9.4 FL (ref 6–12)
POTASSIUM SERPL-SCNC: 3.8 MMOL/L (ref 3.5–5.2)
PROT SERPL-MCNC: 7.8 G/DL (ref 6–8.5)
RBC # BLD AUTO: 5.35 10*6/MM3 (ref 4.14–5.8)
SODIUM SERPL-SCNC: 138 MMOL/L (ref 136–145)
TROPONIN T NUMERIC DELTA: 0 NG/L
TROPONIN T SERPL HS-MCNC: 10 NG/L
WBC NRBC COR # BLD AUTO: 6 10*3/MM3 (ref 3.4–10.8)
WHOLE BLOOD HOLD COAG: NORMAL
WHOLE BLOOD HOLD SPECIMEN: NORMAL

## 2025-07-15 PROCEDURE — 93005 ELECTROCARDIOGRAM TRACING: CPT | Performed by: EMERGENCY MEDICINE

## 2025-07-15 PROCEDURE — 36415 COLL VENOUS BLD VENIPUNCTURE: CPT

## 2025-07-15 PROCEDURE — 85025 COMPLETE CBC W/AUTO DIFF WBC: CPT | Performed by: EMERGENCY MEDICINE

## 2025-07-15 PROCEDURE — 80053 COMPREHEN METABOLIC PANEL: CPT | Performed by: EMERGENCY MEDICINE

## 2025-07-15 PROCEDURE — 83735 ASSAY OF MAGNESIUM: CPT | Performed by: EMERGENCY MEDICINE

## 2025-07-15 PROCEDURE — 71045 X-RAY EXAM CHEST 1 VIEW: CPT

## 2025-07-15 PROCEDURE — 82948 REAGENT STRIP/BLOOD GLUCOSE: CPT | Performed by: EMERGENCY MEDICINE

## 2025-07-15 PROCEDURE — 99284 EMERGENCY DEPT VISIT MOD MDM: CPT

## 2025-07-15 PROCEDURE — 84484 ASSAY OF TROPONIN QUANT: CPT | Performed by: EMERGENCY MEDICINE

## 2025-07-15 RX ORDER — SODIUM CHLORIDE 0.9 % (FLUSH) 0.9 %
10 SYRINGE (ML) INJECTION AS NEEDED
Status: DISCONTINUED | OUTPATIENT
Start: 2025-07-15 | End: 2025-07-15 | Stop reason: HOSPADM

## 2025-07-15 RX ORDER — AMLODIPINE BESYLATE 5 MG/1
10 TABLET ORAL DAILY
Qty: 20 TABLET | Refills: 0 | Status: SHIPPED | OUTPATIENT
Start: 2025-07-15
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
## 2025-07-15 NOTE — Clinical Note
[facility] EMERGENCY ROOM  [address] [place] [address]  Phone: [phone]  Fax: [phone]    Jose Lyn was seen and treated in our emergency department on 7/15/2025.  He may return to work on 07/16/2025.         Thank you for choosing [facility].    Carolina Newton MD

## 2025-07-20 LAB
QT INTERVAL: 370 MS
QTC INTERVAL: 432 MS

## 2025-07-22 NOTE — TELEPHONE ENCOUNTER
Tried to contact pt, no answer and couldn't leave a message. Pt is going to need to be seen prior to 8/12/2025, do to medication from ER running out.     HUB can RELAY and move appt up.

## 2025-07-24 PROCEDURE — 87637 SARSCOV2&INF A&B&RSV AMP PRB: CPT

## 2025-08-07 ENCOUNTER — OFFICE VISIT (OUTPATIENT)
Dept: GASTROENTEROLOGY | Facility: CLINIC | Age: 40
End: 2025-08-07
Payer: COMMERCIAL

## 2025-08-07 VITALS
DIASTOLIC BLOOD PRESSURE: 79 MMHG | BODY MASS INDEX: 29.13 KG/M2 | WEIGHT: 227 LBS | HEART RATE: 83 BPM | SYSTOLIC BLOOD PRESSURE: 132 MMHG | HEIGHT: 74 IN

## 2025-08-07 DIAGNOSIS — F10.90 ALCOHOL USE: ICD-10-CM

## 2025-08-07 DIAGNOSIS — K92.1 BLOOD IN STOOL: ICD-10-CM

## 2025-08-07 DIAGNOSIS — Z80.0 FAMILY HISTORY OF COLON CANCER: ICD-10-CM

## 2025-08-07 DIAGNOSIS — Z86.0100 HISTORY OF COLON POLYPS: ICD-10-CM

## 2025-08-07 DIAGNOSIS — K76.0 FATTY LIVER: ICD-10-CM

## 2025-08-07 DIAGNOSIS — R79.89 ELEVATED LFTS: Primary | ICD-10-CM

## 2025-08-21 ENCOUNTER — LAB (OUTPATIENT)
Facility: HOSPITAL | Age: 40
End: 2025-08-21
Payer: COMMERCIAL

## 2025-08-26 ENCOUNTER — HOSPITAL ENCOUNTER (OUTPATIENT)
Dept: ULTRASOUND IMAGING | Facility: HOSPITAL | Age: 40
Discharge: HOME | End: 2025-08-26
Admitting: NURSE PRACTITIONER
Payer: COMMERCIAL

## 2025-08-26 DIAGNOSIS — R79.89 ELEVATED LFTS: ICD-10-CM

## 2025-08-26 PROCEDURE — 76705 ECHO EXAM OF ABDOMEN: CPT

## (undated) DEVICE — BLCK/BITE BLOX WO/DENTL/RIM W/STRAP 54F

## (undated) DEVICE — SINGLE-USE BIOPSY FORCEPS: Brand: RADIAL JAW 4

## (undated) DEVICE — THE SINGLE USE ETRAP – POLYP TRAP IS USED FOR SUCTION RETRIEVAL OF ENDOSCOPICALLY REMOVED POLYPS.: Brand: ETRAP

## (undated) DEVICE — LINER SURG CANSTR SXN S/RIGD 1500CC

## (undated) DEVICE — Device: Brand: DEFENDO AIR/WATER/SUCTION AND BIOPSY VALVE

## (undated) DEVICE — SNAR E/S POLYP SNAREMASTER OVL/10MM 2.8X2300MM YEL

## (undated) DEVICE — SOLIDIFIER LIQLOC PLS 1500CC BT

## (undated) DEVICE — Device

## (undated) DEVICE — CONN JET HYDRA H20 AUXILIARY DISP

## (undated) DEVICE — SOL IRRG H2O PL/BG 1000ML STRL